# Patient Record
Sex: MALE | Race: WHITE | NOT HISPANIC OR LATINO
[De-identification: names, ages, dates, MRNs, and addresses within clinical notes are randomized per-mention and may not be internally consistent; named-entity substitution may affect disease eponyms.]

---

## 2017-04-25 PROBLEM — Z00.00 ENCOUNTER FOR PREVENTIVE HEALTH EXAMINATION: Status: ACTIVE | Noted: 2017-04-25

## 2017-04-26 ENCOUNTER — APPOINTMENT (OUTPATIENT)
Dept: MRI IMAGING | Facility: CLINIC | Age: 62
End: 2017-04-26

## 2017-04-26 ENCOUNTER — APPOINTMENT (OUTPATIENT)
Dept: NEUROLOGY | Facility: CLINIC | Age: 62
End: 2017-04-26

## 2017-04-26 ENCOUNTER — OUTPATIENT (OUTPATIENT)
Dept: OUTPATIENT SERVICES | Facility: HOSPITAL | Age: 62
LOS: 1 days | End: 2017-04-26

## 2017-04-26 VITALS
SYSTOLIC BLOOD PRESSURE: 141 MMHG | DIASTOLIC BLOOD PRESSURE: 95 MMHG | OXYGEN SATURATION: 93 % | HEIGHT: 69 IN | TEMPERATURE: 98 F | BODY MASS INDEX: 28.88 KG/M2 | WEIGHT: 195 LBS | HEART RATE: 72 BPM

## 2017-04-26 RX ORDER — ATORVASTATIN CALCIUM 40 MG/1
40 TABLET, FILM COATED ORAL DAILY
Refills: 0 | Status: ACTIVE | COMMUNITY

## 2017-04-27 LAB
ANION GAP SERPL CALC-SCNC: 15 MMOL/L
BUN SERPL-MCNC: 11 MG/DL
CALCIUM SERPL-MCNC: 9.8 MG/DL
CHLORIDE SERPL-SCNC: 101 MMOL/L
CO2 SERPL-SCNC: 24 MMOL/L
CREAT SERPL-MCNC: 0.97 MG/DL
GLUCOSE SERPL-MCNC: 106 MG/DL
POTASSIUM SERPL-SCNC: 4.8 MMOL/L
SODIUM SERPL-SCNC: 140 MMOL/L

## 2017-06-13 ENCOUNTER — INPATIENT (INPATIENT)
Facility: HOSPITAL | Age: 62
LOS: 7 days | Discharge: NURSING, REHAB OR LONG-TERM CARE | DRG: 473 | End: 2017-06-21
Attending: NEUROLOGICAL SURGERY | Admitting: NEUROLOGICAL SURGERY
Payer: COMMERCIAL

## 2017-06-13 VITALS
TEMPERATURE: 98 F | SYSTOLIC BLOOD PRESSURE: 128 MMHG | DIASTOLIC BLOOD PRESSURE: 86 MMHG | OXYGEN SATURATION: 99 % | RESPIRATION RATE: 18 BRPM | HEART RATE: 78 BPM

## 2017-06-13 LAB
ALBUMIN SERPL ELPH-MCNC: 4.3 G/DL — SIGNIFICANT CHANGE UP (ref 3.3–5)
ALP SERPL-CCNC: 76 U/L — SIGNIFICANT CHANGE UP (ref 40–120)
ALT FLD-CCNC: 26 U/L — SIGNIFICANT CHANGE UP (ref 10–45)
ANION GAP SERPL CALC-SCNC: 14 MMOL/L — SIGNIFICANT CHANGE UP (ref 5–17)
AST SERPL-CCNC: 20 U/L — SIGNIFICANT CHANGE UP (ref 10–40)
BILIRUB SERPL-MCNC: 0.4 MG/DL — SIGNIFICANT CHANGE UP (ref 0.2–1.2)
BUN SERPL-MCNC: 13 MG/DL — SIGNIFICANT CHANGE UP (ref 7–23)
CALCIUM SERPL-MCNC: 9.5 MG/DL — SIGNIFICANT CHANGE UP (ref 8.4–10.5)
CHLORIDE SERPL-SCNC: 99 MMOL/L — SIGNIFICANT CHANGE UP (ref 96–108)
CO2 SERPL-SCNC: 23 MMOL/L — SIGNIFICANT CHANGE UP (ref 22–31)
CREAT SERPL-MCNC: 0.8 MG/DL — SIGNIFICANT CHANGE UP (ref 0.5–1.3)
CRP SERPL-MCNC: 1.1 MG/DL — HIGH (ref 0–0.4)
ERYTHROCYTE [SEDIMENTATION RATE] IN BLOOD: 45 MM/HR — HIGH
GLUCOSE SERPL-MCNC: 114 MG/DL — HIGH (ref 70–99)
HCT VFR BLD CALC: 46.1 % — SIGNIFICANT CHANGE UP (ref 39–50)
HGB BLD-MCNC: 15.5 G/DL — SIGNIFICANT CHANGE UP (ref 13–17)
MCHC RBC-ENTMCNC: 29.1 PG — SIGNIFICANT CHANGE UP (ref 27–34)
MCHC RBC-ENTMCNC: 33.6 G/DL — SIGNIFICANT CHANGE UP (ref 32–36)
MCV RBC AUTO: 86.5 FL — SIGNIFICANT CHANGE UP (ref 80–100)
PLATELET # BLD AUTO: 362 K/UL — SIGNIFICANT CHANGE UP (ref 150–400)
POTASSIUM SERPL-MCNC: 4.2 MMOL/L — SIGNIFICANT CHANGE UP (ref 3.5–5.3)
POTASSIUM SERPL-SCNC: 4.2 MMOL/L — SIGNIFICANT CHANGE UP (ref 3.5–5.3)
PROT SERPL-MCNC: 8.1 G/DL — SIGNIFICANT CHANGE UP (ref 6–8.3)
RBC # BLD: 5.33 M/UL — SIGNIFICANT CHANGE UP (ref 4.2–5.8)
RBC # FLD: 12.7 % — SIGNIFICANT CHANGE UP (ref 10.3–16.9)
SODIUM SERPL-SCNC: 136 MMOL/L — SIGNIFICANT CHANGE UP (ref 135–145)
WBC # BLD: 8.5 K/UL — SIGNIFICANT CHANGE UP (ref 3.8–10.5)
WBC # FLD AUTO: 8.5 K/UL — SIGNIFICANT CHANGE UP (ref 3.8–10.5)

## 2017-06-13 PROCEDURE — 72125 CT NECK SPINE W/O DYE: CPT | Mod: 26

## 2017-06-13 PROCEDURE — 63082 REMOVE VERTEBRAL BODY ADD-ON: CPT

## 2017-06-13 PROCEDURE — 72156 MRI NECK SPINE W/O & W/DYE: CPT | Mod: 26

## 2017-06-13 PROCEDURE — 22554 ARTHRD ANT NTRBD MIN DSC CRV: CPT

## 2017-06-13 PROCEDURE — 22854 INSJ BIOMECHANICAL DEVICE: CPT

## 2017-06-13 PROCEDURE — 99285 EMERGENCY DEPT VISIT HI MDM: CPT | Mod: 25

## 2017-06-13 PROCEDURE — 22845 INSERT SPINE FIXATION DEVICE: CPT | Mod: 59

## 2017-06-13 PROCEDURE — 72052 X-RAY EXAM NECK SPINE 6/>VWS: CPT | Mod: 26

## 2017-06-13 PROCEDURE — 63081 REMOVE VERT BODY DCMPRN CRVL: CPT

## 2017-06-13 PROCEDURE — 22846 INSERT SPINE FIXATION DEVICE: CPT | Mod: 59

## 2017-06-13 PROCEDURE — 71010: CPT | Mod: 26

## 2017-06-13 PROCEDURE — 99223 1ST HOSP IP/OBS HIGH 75: CPT

## 2017-06-13 PROCEDURE — 22585 ARTHRD ANT NTRBD MIN DSC EA: CPT

## 2017-06-13 RX ORDER — DEXAMETHASONE 0.5 MG/5ML
96 ELIXIR ORAL ONCE
Qty: 0 | Refills: 0 | Status: COMPLETED | OUTPATIENT
Start: 2017-06-13 | End: 2017-06-13

## 2017-06-13 RX ORDER — PANTOPRAZOLE SODIUM 20 MG/1
40 TABLET, DELAYED RELEASE ORAL
Qty: 0 | Refills: 0 | Status: DISCONTINUED | OUTPATIENT
Start: 2017-06-13 | End: 2017-06-16

## 2017-06-13 RX ADMIN — Medication 119.2 MILLIGRAM(S): at 23:57

## 2017-06-13 NOTE — CONSULT NOTE ADULT - ASSESSMENT
Symptoms, exam, and imaging consistent with recurrent cord compression at C5/6, possibly due to instability at C6/7. Will get CT w/o contrast, x-rays with flex/ext. Check coags, type and screen. Neurosurgery consult. Admit under me to regional bed.

## 2017-06-13 NOTE — ED PROVIDER NOTE - OBJECTIVE STATEMENT
61M with weakness for 4-5 days, hx of C4/C5 spinal fusion 2 months ago. Reports tingling/numbness in finger tips. Sent in by Dr. Mitchell for stat MRI, NSG consultation. Pt denies cp/sob.

## 2017-06-13 NOTE — ED PROVIDER NOTE - MEDICAL DECISION MAKING DETAILS
61M with concern for cervical cord compression, R sided weakness. Appreciate evaluation in ED by Dr. Mitchell. Doubt infection with nl wbc count, plan for high-dose steroids, NSG consultation for spine and admission for possible operative repair tomorrow. Hx of previous spinal surgery, weakness x 4-5 days.

## 2017-06-13 NOTE — ED ADULT TRIAGE NOTE - CHIEF COMPLAINT QUOTE
BIBA from home , c/o bilateral leg weakness started last night and heaviness to lt arm started for awhile. s/o C5 C6 fx with surgery done last month. Pt unable to move and put weight on left leg.

## 2017-06-13 NOTE — CONSULT NOTE ADULT - SUBJECTIVE AND OBJECTIVE BOX
HPI: This is a 61 year old man with a history of cervical cord compression s/p C5/6  anterior decompression and fusion 5/18/17, p/w worsening weakness. He first presented to me in my office about 6 weeks ago, with 3 months of left leg dragging. He had some weakness and hyperreflexia in the left leg on exam and MRI of the cervical spine showed degenerative disease with cord compression at C5/6 with left-sided signal change. He had decompression and fusion at [facility] by Dr. Malachi Hayes with significant improvement of symptoms; in fact he felt back to normal a few days later. Then about 1 week ago he started feeling generally tired, but nothing too out of the ordinary. On Saturday he felt his left leg / foot turning in a bit when he walked, and some numbness in his hands; however he remained fully functional. Then yesterday, he took a shower, got out about 5:30pm, and sat on the couch; about an hour later he couldn't get up without assistance. He was helped to bed, went to sleep, and woke up feeling even weaker. At that time he called and I advised him to come to the ER as soon as possible. He has been taking tylenol every day and at times oxycodone for post op pain.     ROS: + constipation; no urinary incontinence    Exam:  Gen: appears well, well-groomed  MS: awake, alert, speech fluent, comprehension intact  CN: PERRL, EOMI, face symmetric  Motor: increased tone in LUE>RUE and b/l LE; strength - deltoid, biceps 5/5 b/l; triceps 4- R 4 L, wrist extension and flexion 4+ b/l, finger extension 3 R 2 L, hip flexion 3 R 2 L, knee flexion 4 b/l, knee extension 5 b/l, ankle dorsiflexion 3 R 2 L  Sensory: JPS moderately impaired at big toes b/l; vibration absent in big toes; no sensory level on back or anterior trunk with light touch or pinprick  Reflexes; 3+ UE and patellar, + Queen's L > R, + suprapatellar and cross-adduction b/l, sustained ankle clonus on left, 4-5 beats on right; plantar responses extensory b/l  Gait: unable to assess    MRI cervical spine 6/13/17 - on my read appears to be some retrolisthesis of C6 on C7, and posteriorly displaced osteophyte at C5/6 with resultant cord compression at C5/6 with cord compression and b/l signal change above the level of compression, and left > right signal change below that level. There is no evidence of abscess or hematoma.     WBC 8.5, ESR 45, CRP 1.10.

## 2017-06-13 NOTE — ED PROVIDER NOTE - PROGRESS NOTE DETAILS
Dr. Mitchell at bedside, plan for cervical spine MRI, will obtain UA as well. Pt with R/L weakness, concern for neuro sx after cervical spine surgery 1 month ago.

## 2017-06-14 DIAGNOSIS — G95.20 UNSPECIFIED CORD COMPRESSION: ICD-10-CM

## 2017-06-14 DIAGNOSIS — Z98.1 ARTHRODESIS STATUS: Chronic | ICD-10-CM

## 2017-06-14 DIAGNOSIS — R53.1 WEAKNESS: ICD-10-CM

## 2017-06-14 DIAGNOSIS — E78.5 HYPERLIPIDEMIA, UNSPECIFIED: ICD-10-CM

## 2017-06-14 DIAGNOSIS — R63.8 OTHER SYMPTOMS AND SIGNS CONCERNING FOOD AND FLUID INTAKE: ICD-10-CM

## 2017-06-14 DIAGNOSIS — K59.00 CONSTIPATION, UNSPECIFIED: ICD-10-CM

## 2017-06-14 DIAGNOSIS — Z29.9 ENCOUNTER FOR PROPHYLACTIC MEASURES, UNSPECIFIED: ICD-10-CM

## 2017-06-14 LAB
APTT BLD: 31.4 SEC — SIGNIFICANT CHANGE UP (ref 27.5–37.4)
BLD GP AB SCN SERPL QL: NEGATIVE — SIGNIFICANT CHANGE UP
HCV AB S/CO SERPL IA: 0.16 S/CO — SIGNIFICANT CHANGE UP
HCV AB SERPL-IMP: SIGNIFICANT CHANGE UP
INR BLD: 1.07 — SIGNIFICANT CHANGE UP (ref 0.88–1.16)
PROTHROM AB SERPL-ACNC: 11.9 SEC — SIGNIFICANT CHANGE UP (ref 9.8–12.7)
RH IG SCN BLD-IMP: POSITIVE — SIGNIFICANT CHANGE UP
RH IG SCN BLD-IMP: POSITIVE — SIGNIFICANT CHANGE UP

## 2017-06-14 PROCEDURE — 72146 MRI CHEST SPINE W/O DYE: CPT | Mod: 26

## 2017-06-14 PROCEDURE — 99233 SBSQ HOSP IP/OBS HIGH 50: CPT

## 2017-06-14 PROCEDURE — 93010 ELECTROCARDIOGRAM REPORT: CPT

## 2017-06-14 RX ORDER — ATORVASTATIN CALCIUM 80 MG/1
20 TABLET, FILM COATED ORAL AT BEDTIME
Qty: 0 | Refills: 0 | Status: DISCONTINUED | OUTPATIENT
Start: 2017-06-14 | End: 2017-06-21

## 2017-06-14 RX ORDER — SODIUM CHLORIDE 9 MG/ML
1000 INJECTION, SOLUTION INTRAVENOUS
Qty: 0 | Refills: 0 | Status: DISCONTINUED | OUTPATIENT
Start: 2017-06-14 | End: 2017-06-15

## 2017-06-14 RX ORDER — DEXAMETHASONE 0.5 MG/5ML
24 ELIXIR ORAL EVERY 6 HOURS
Qty: 0 | Refills: 0 | Status: DISCONTINUED | OUTPATIENT
Start: 2017-06-14 | End: 2017-06-15

## 2017-06-14 RX ORDER — POLYETHYLENE GLYCOL 3350 17 G/17G
17 POWDER, FOR SOLUTION ORAL DAILY
Qty: 0 | Refills: 0 | Status: DISCONTINUED | OUTPATIENT
Start: 2017-06-14 | End: 2017-06-21

## 2017-06-14 RX ORDER — ATORVASTATIN CALCIUM 80 MG/1
1 TABLET, FILM COATED ORAL
Qty: 0 | Refills: 0 | COMMUNITY

## 2017-06-14 RX ORDER — DEXTROSE 50 % IN WATER 50 %
12.5 SYRINGE (ML) INTRAVENOUS ONCE
Qty: 0 | Refills: 0 | Status: DISCONTINUED | OUTPATIENT
Start: 2017-06-14 | End: 2017-06-15

## 2017-06-14 RX ORDER — DEXAMETHASONE 0.5 MG/5ML
24 ELIXIR ORAL EVERY 6 HOURS
Qty: 0 | Refills: 0 | Status: DISCONTINUED | OUTPATIENT
Start: 2017-06-14 | End: 2017-06-14

## 2017-06-14 RX ORDER — INSULIN LISPRO 100/ML
VIAL (ML) SUBCUTANEOUS
Qty: 0 | Refills: 0 | Status: DISCONTINUED | OUTPATIENT
Start: 2017-06-14 | End: 2017-06-15

## 2017-06-14 RX ORDER — GLUCAGON INJECTION, SOLUTION 0.5 MG/.1ML
1 INJECTION, SOLUTION SUBCUTANEOUS ONCE
Qty: 0 | Refills: 0 | Status: DISCONTINUED | OUTPATIENT
Start: 2017-06-14 | End: 2017-06-21

## 2017-06-14 RX ORDER — SODIUM CHLORIDE 9 MG/ML
1000 INJECTION INTRAMUSCULAR; INTRAVENOUS; SUBCUTANEOUS
Qty: 0 | Refills: 0 | Status: DISCONTINUED | OUTPATIENT
Start: 2017-06-14 | End: 2017-06-14

## 2017-06-14 RX ORDER — DOCUSATE SODIUM 100 MG
100 CAPSULE ORAL DAILY
Qty: 0 | Refills: 0 | Status: DISCONTINUED | OUTPATIENT
Start: 2017-06-14 | End: 2017-06-15

## 2017-06-14 RX ORDER — HEPARIN SODIUM 5000 [USP'U]/ML
5000 INJECTION INTRAVENOUS; SUBCUTANEOUS EVERY 8 HOURS
Qty: 0 | Refills: 0 | Status: DISCONTINUED | OUTPATIENT
Start: 2017-06-14 | End: 2017-06-14

## 2017-06-14 RX ORDER — DEXTROSE 50 % IN WATER 50 %
25 SYRINGE (ML) INTRAVENOUS ONCE
Qty: 0 | Refills: 0 | Status: DISCONTINUED | OUTPATIENT
Start: 2017-06-14 | End: 2017-06-15

## 2017-06-14 RX ORDER — DEXTROSE 50 % IN WATER 50 %
1 SYRINGE (ML) INTRAVENOUS ONCE
Qty: 0 | Refills: 0 | Status: DISCONTINUED | OUTPATIENT
Start: 2017-06-14 | End: 2017-06-21

## 2017-06-14 RX ORDER — SODIUM CHLORIDE 9 MG/ML
1000 INJECTION, SOLUTION INTRAVENOUS
Qty: 0 | Refills: 0 | Status: DISCONTINUED | OUTPATIENT
Start: 2017-06-14 | End: 2017-06-21

## 2017-06-14 RX ADMIN — HEPARIN SODIUM 5000 UNIT(S): 5000 INJECTION INTRAVENOUS; SUBCUTANEOUS at 06:16

## 2017-06-14 RX ADMIN — PANTOPRAZOLE SODIUM 40 MILLIGRAM(S): 20 TABLET, DELAYED RELEASE ORAL at 06:16

## 2017-06-14 RX ADMIN — POLYETHYLENE GLYCOL 3350 17 GRAM(S): 17 POWDER, FOR SOLUTION ORAL at 11:55

## 2017-06-14 RX ADMIN — Medication: at 23:17

## 2017-06-14 RX ADMIN — Medication 2: at 17:27

## 2017-06-14 RX ADMIN — Medication 112 MILLIGRAM(S): at 13:15

## 2017-06-14 RX ADMIN — ATORVASTATIN CALCIUM 20 MILLIGRAM(S): 80 TABLET, FILM COATED ORAL at 21:31

## 2017-06-14 RX ADMIN — Medication 112 MILLIGRAM(S): at 20:30

## 2017-06-14 RX ADMIN — SODIUM CHLORIDE 70 MILLILITER(S): 9 INJECTION, SOLUTION INTRAVENOUS at 11:54

## 2017-06-14 RX ADMIN — Medication 100 MILLIGRAM(S): at 11:55

## 2017-06-14 NOTE — H&P ADULT - ATTENDING COMMENTS
This morning ~830am patient was re-assessed. He subjectively feels that it is easier to move his legs than he was prior to receiving dexamethasone. However exam is largely unchanged, with significant weakness of triceps, distal UEs, and L > R LEs with 1-2/5 left hip flexion, ankle dorsiflexion, and toe flexion / extension. Sustained ankle clonus and prominent downey's sign on left, both less prominent on the right. JPS impaired in left big toe, pinprick diminished on right compared to left. There appears to be a mid-thoracic anterior sensory level to pinprick on the right.     Imaging reviewed with Dr. Fournier and Dr. Lunsford. Will get MRI thoracic spine to rule out any other areas of cord compression. Continue dexamethasone 24mg q6h IV. IV fluids, pre-op labs, keep NPO.

## 2017-06-14 NOTE — H&P ADULT - NSHPLABSRESULTS_GEN_ALL_CORE
.  LABS:                         15.5   8.5   )-----------( 362      ( 13 Jun 2017 21:07 )             46.1     06-13    136  |  99  |  13  ----------------------------<  114<H>  4.2   |  23  |  0.80    Ca    9.5      13 Jun 2017 21:07    TPro  8.1  /  Alb  4.3  /  TBili  0.4  /  DBili  x   /  AST  20  /  ALT  26  /  AlkPhos  76  06-13    RADIOLOGY, EKG & ADDITIONAL TESTS:      CT CERVICAL SPINE    06/13/2017   ******PRELIMINARY REPORT******      IMPRESSION:  Status post C5-6 anterior cervical spinal fusion. Severe stenosis of the   spinal canal and bilateral  neural foramina at C5-6 and C6-7. Moderate to severe stenosis of the   spinal canal with severe  stenosis of the bilateral neural foramina at C3-4 and C4-5.  Prevertebral soft tissue collection near the surgical site likely   representing residual hematoma or  possibly seroma.     MR CERVICAL SPINE WO - W CON     6/13/2017    ******PRELIMINARY REPORT******    IMPRESSION:  Status post C5-6 anterior cervical spinal fusion with a moderate   prevertebral fluid collection likely  representing postoperative hematoma. No other significant interval   change. Severe persistent  stenosis of the spinal canal and bilateral neural foramina at C5-6 with   mass effect on the cord as  discussed above. Stable focal cord edema or possibly myelomalacia at   C5-6. Severe stenosis of the  spinal canal and bilateral neural foramina at C4-5 and C6-7.

## 2017-06-14 NOTE — PROGRESS NOTE ADULT - PROBLEM SELECTOR PLAN 1
D/w Dr. Mitchell, will discuss case with Dr. Fournier and present at spine conference in AM,  Decadron, PPI, ISS,  Neurology admission,  Will follow closely, possible plan for OR.

## 2017-06-14 NOTE — H&P ADULT - PROBLEM SELECTOR PLAN 6
NPO after midnight for possbile OR. Dash/TLC diet may be resumed if no OR. Replete lytes if k<4, mg<2

## 2017-06-14 NOTE — CHART NOTE - NSCHARTNOTEFT_GEN_A_CORE
Patient initially admitted under Neurology service. Being transferred to neurosurgery with plan for surgical revision of C-spine tomorrow. Will continue on 24 hrs of IV steroids prior to surgery. Patient initially admitted under Neurology service. Being transferred to neurosurgery with plan for surgical revision of C-spine tomorrow. Will continue on 24 hrs of IV steroids prior to surgery.    Notified by nurse that patient found to have urinary retention with 1000cc on bladder scan. Neurosurgery notified. Han catheter placed. Exam otherwise unchanged.

## 2017-06-14 NOTE — H&P ADULT - HISTORY OF PRESENT ILLNESS
62yo M w/ PMHx of HLD, h/o cervical cord compression s/p C5/C6 anterior decompression and fusion on 5/18/17 presents with weakness. Pt states that last week he started noticing his legs were more weak and his left foot started dragging. Yesterday, his weakness worsened dramatically to the point that he was unable to walk and get up without assistance. Since his surgery, he has been taking XS tylenol and oxycodone PRN for pain control. He denies urinary or fecal incontinence, with last BM 3-4 days ago. Denies fever, chills, nausea, vomiting, SOB, headache, diarrhea.     In ED, T 97.8F, HR 78, /86, RR 18, 99% on RA. Neurology consulted (Dr. Mitchell) as this patient is well-known to Dr. Mitchell. MRI cervical spine concerning for cord compression. Given decadron 96mg IVPB x1. Neurosurgery consulted who agreed with steroids with possible OR to be discussed tomorrow.

## 2017-06-14 NOTE — H&P ADULT - PROBLEM SELECTOR PLAN 4
HSQ q8h Pt has not had BM for 3-4 days. Has been taking oxycodone PRN for pain relief s/p cervical spinal fusion  - will order miralax, colace for bowel regimen

## 2017-06-14 NOTE — PROGRESS NOTE ADULT - SUBJECTIVE AND OBJECTIVE BOX
HISTORY OF PRESENT ILLNESS: 61 year old male w/ HLD, h/o cervical spinal cord compression s/p ACDF C5-C6 at S 4 weeks ago presents to ED today with complaints of worsening arm and leg weakness as well as sensation as of Friday with significant worsening as of last night. He reports feeling very week in his forearms and hands with some numbness of distal right arm as well as being unable to walk and feeling very weak in his legs. He denies numbness in his legs and denies bowel/bladder changes. He denies any significant neck or extremity pain. No recent injury/illness. He had some mild weakness in his hands from before his anterior cervical surgery but the current symptoms are significantly worse. MRI today is minimally changed from MRI in May with post-operative findings and persistent spinal cord compression with myelomalacia and severe spinal stenosis worst at C5-6, 6-7    PAST MEDICAL & SURGICAL HISTORY: As above, HLD on Lipitor     FAMILY HISTORY: No pertinent family history      SOCIAL HISTORY:  Tobacco Use: Denies smoking  EtOH use:  Denies ETOH abuse  Substance: Denies illicit drug use      Allergies    No Known Allergies          REVIEW OF SYSTEMS  Non-contributory.    MEDICATIONS:    Neuro:  diazepam    Tablet 5milliGRAM(s) Oral every 6 hours PRN    Anticoagulation:  heparin  Injectable 5000Unit(s) SubCutaneous every 8 hours    OTHER:  pantoprazole    Tablet 40milliGRAM(s) Oral before breakfast  insulin lispro (HumaLOG) corrective regimen sliding scale  SubCutaneous Before meals and at bedtime  dextrose Gel 1Dose(s) Oral once PRN  dextrose 50% Injectable 12.5Gram(s) IV Push once  dextrose 50% Injectable 25Gram(s) IV Push once  dextrose 50% Injectable 25Gram(s) IV Push once  glucagon  Injectable 1milliGRAM(s) IntraMuscular once PRN    IVF:  dextrose 5%. 1000milliLiter(s) IV Continuous <Continuous>      Vital Signs Last 24 Hrs  T(C): 36.6, Max: 36.6 (06-13 @ 20:14)  T(F): 97.8, Max: 97.8 (06-13 @ 20:14)  HR: 78 (78 - 78)  BP: 128/86 (128/86 - 128/86)  BP(mean): --  RR: 18 (18 - 18)  SpO2: 99% (99% - 99%)    PHYSICAL EXAM:  Gen: NAD, AAOx3. WN/WD  HEENT: PERRL. EOMI. VF intact. NC/AT.  Neck: Well healed left anterior neck incision. Nontender, FROM  Neuro: CNs II-XII intact. Hyperreflexic throughout with Clonus in b/l LEs. Sensation to LT intact throughout. Following commands. Gait not assessed.  3/5 in b/l hand , 4-/5 in b/l elbow extension, 4+/5 in elbow flexion, 5/5 in shoulder adduction. 2/5 in RLE hip flexion, and 3/5 in b/l plantarflexion, otherwise 1/5 throughout lower extremities.    LABS:                        15.5   8.5   )-----------( 362      ( 13 Jun 2017 21:07 )             46.1     06-13    136  |  99  |  13  ----------------------------<  114<H>  4.2   |  23  |  0.80    Ca    9.5      13 Jun 2017 21:07    TPro  8.1  /  Alb  4.3  /  TBili  0.4  /  DBili  x   /  AST  20  /  ALT  26  /  AlkPhos  76  06-13      RADIOLOGY & ADDITIONAL STUDIES: Status post C5-6 anterior cervical spinal fusion with a moderate   prevertebral fluid collection likely  representing postoperative hematoma. No other significant interval   change. Severe persistent  stenosis of the spinal canal and bilateral neural foramina at C5-6 with   mass effect on the cord as  discussed above. Stable focal cord edema or possibly myelomalacia at   C5-6. Severe stenosis of the  spinal canal and bilateral neural foramina at C4-5 and C6-7.

## 2017-06-14 NOTE — H&P ADULT - PROBLEM SELECTOR PLAN 5
NPO after midnight for possbile OR. Dash/TLC diet may be resumed if no OR. Replete lytes if k<4, mg<2 HSQ q8h

## 2017-06-14 NOTE — PATIENT PROFILE ADULT. - VISION (WITH CORRECTIVE LENSES IF THE PATIENT USUALLY WEARS THEM):
Partially impaired: cannot see medication labels or newsprint, but can see obstacles in path, and the surrounding layout; can count fingers at arm's length/reading eyeglaases

## 2017-06-14 NOTE — H&P ADULT - NSHPPHYSICALEXAM_GEN_ALL_CORE
.  VITAL SIGNS:  T(C): 36.6, Max: 36.6 (06-13 @ 20:14)  T(F): 97.9, Max: 97.9 (06-14 @ 00:59)  HR: 69 (69 - 78)  BP: 126/81 (126/81 - 128/86)  BP(mean): --  RR: 18 (18 - 18)  SpO2: 95% (95% - 99%)  Wt(kg): --    PHYSICAL EXAM:    Constitutional: WDWN resting comfortably in bed; NAD  Head: NC/AT  Eyes: PERRL, EOMI, clear conjunctiva  ENT: no nasal discharge; uvula midline, no oropharyngeal erythema or exudates; MMM  Neck: c-collar in place.   Respiratory: CTA B/L; no W/R/R, no retractions  Cardiac: +S1/S2; RRR; no M/R/G; PMI non-displaced  Gastrointestinal: soft, NT/ND; no rebound or guarding; hyperactive BS  Genitourinary: normal external genitalia  Back: did not perform cervical neck/back exam as pt in c-collar  Extremities: WWP, no clubbing or cyanosis; no peripheral edema  Musculoskeletal: no joint swelling, tenderness or erythema  Neurologic: AAOx3; CNII-XII grossly intact; 4/5 strength in UE b/l. 0/5 strength in LE bilaterally. sensation decreased, but intact in all extremities. Hyperreflexic in brachioradialis, patellar and achilles tendon

## 2017-06-14 NOTE — H&P ADULT - PROBLEM SELECTOR PLAN 1
Pt presenting with severe weakness in b/l LE, mildly decreased sensation in LE and mild weakness in b/l UE most likely 2/2 cervical cord compression in setting of spinal fusion of C5/C6 on 5/18/17.   - bladder scan for urinary retention  - f/u final read of MRI and CT cervical spine   - c/w decadron 24mg IVPB q6h starting at 10am   - s/p decadron 96mg IVPB x1 in ED  - ISS, protonix 40mg qAM  - neurosurgery consulted - will discuss case with Dr. Mitchell in AM   - NPO after midnight for possible OR  - will order coags, t&s for AM

## 2017-06-14 NOTE — PROGRESS NOTE ADULT - SUBJECTIVE AND OBJECTIVE BOX
Surgery: C5-6 corpectomy, C4-7 fusion  Consent: Signed by patient    No Known Allergies      OVERNIGHT EVENTS:    T(C): 36.7, Max: 36.8 (06-14 @ 08:00)  HR: 80 (69 - 83)  BP: 135/92 (112/79 - 145/91)  RR: 18 (16 - 18)  SpO2: 97% (95% - 99%)  Wt(kg): --    EXAM: A/Ox3, FC, speech clear  Bilateral UE 4/5  Right LE 2/5, Left LE 1/5  Sensation intact light touch all dermatomes      06-13    136  |  99  |  13  ----------------------------<  114<H>  4.2   |  23  |  0.80    Ca    9.5      13 Jun 2017 21:07    TPro  8.1  /  Alb  4.3  /  TBili  0.4  /  DBili  x   /  AST  20  /  ALT  26  /  AlkPhos  76  06-13    CBC Full  -  ( 13 Jun 2017 21:07 )  WBC Count : 8.5 K/uL  Hemoglobin : 15.5 g/dL  Hematocrit : 46.1 %  Platelet Count - Automated : 362 K/uL  Mean Cell Volume : 86.5 fL  Mean Cell Hemoglobin : 29.1 pg  Mean Cell Hemoglobin Concentration : 33.6 g/dL  Auto Neutrophil # : x  Auto Lymphocyte # : x  Auto Monocyte # : x  Auto Eosinophil # : x  Auto Basophil # : x  Auto Neutrophil % : x  Auto Lymphocyte % : x  Auto Monocyte % : x  Auto Eosinophil % : x  Auto Basophil % : x    PT/INR - ( 14 Jun 2017 11:31 )   PT: 11.9 sec;   INR: 1.07          PTT - ( 14 Jun 2017 11:31 )  PTT:31.4 sec    Pregnancy test: n/a  Type & Screen (in past 72hrs): ordered    CXR: n/a  EKG: ordered  ECHO: n/a   Medical Clearances: urgent surgery  Other Clearances:     Last dose of antiplatelet/anticoagulation drug: n/a    Implanted Devices (pacemaker, drug pump...etc):  []YES   [x] NO                  If yes --> EPS consulted to interrogate/adjust device:                 Assessment: preop for C5-6 corpectomy, C4-7 fusion    Plan:  -F/U preop labs and EKG  -Continue NPO except meds  -Continue Decadron 24q6h  -Consent in chart  -D/W Dr. Fournier

## 2017-06-15 ENCOUNTER — APPOINTMENT (OUTPATIENT)
Dept: SPINE | Facility: HOSPITAL | Age: 62
End: 2017-06-15

## 2017-06-15 ENCOUNTER — RESULT REVIEW (OUTPATIENT)
Age: 62
End: 2017-06-15

## 2017-06-15 LAB
ANION GAP SERPL CALC-SCNC: 17 MMOL/L — SIGNIFICANT CHANGE UP (ref 5–17)
BUN SERPL-MCNC: 31 MG/DL — HIGH (ref 7–23)
CALCIUM SERPL-MCNC: 9.3 MG/DL — SIGNIFICANT CHANGE UP (ref 8.4–10.5)
CHLORIDE SERPL-SCNC: 98 MMOL/L — SIGNIFICANT CHANGE UP (ref 96–108)
CO2 SERPL-SCNC: 23 MMOL/L — SIGNIFICANT CHANGE UP (ref 22–31)
CREAT SERPL-MCNC: 1.3 MG/DL — SIGNIFICANT CHANGE UP (ref 0.5–1.3)
GLUCOSE SERPL-MCNC: 141 MG/DL — HIGH (ref 70–99)
HCT VFR BLD CALC: 41.4 % — SIGNIFICANT CHANGE UP (ref 39–50)
HGB BLD-MCNC: 14.4 G/DL — SIGNIFICANT CHANGE UP (ref 13–17)
LYMPHOCYTES # BLD AUTO: 3.6 % — LOW (ref 13–44)
MCHC RBC-ENTMCNC: 30.1 PG — SIGNIFICANT CHANGE UP (ref 27–34)
MCHC RBC-ENTMCNC: 34.8 G/DL — SIGNIFICANT CHANGE UP (ref 32–36)
MCV RBC AUTO: 86.4 FL — SIGNIFICANT CHANGE UP (ref 80–100)
MONOCYTES NFR BLD AUTO: 5.7 % — SIGNIFICANT CHANGE UP (ref 2–14)
NEUTROPHILS NFR BLD AUTO: 90.7 % — HIGH (ref 43–77)
PLATELET # BLD AUTO: 343 K/UL — SIGNIFICANT CHANGE UP (ref 150–400)
POTASSIUM SERPL-MCNC: 4.1 MMOL/L — SIGNIFICANT CHANGE UP (ref 3.5–5.3)
POTASSIUM SERPL-SCNC: 4.1 MMOL/L — SIGNIFICANT CHANGE UP (ref 3.5–5.3)
RBC # BLD: 4.79 M/UL — SIGNIFICANT CHANGE UP (ref 4.2–5.8)
RBC # FLD: 12.3 % — SIGNIFICANT CHANGE UP (ref 10.3–16.9)
SODIUM SERPL-SCNC: 138 MMOL/L — SIGNIFICANT CHANGE UP (ref 135–145)
WBC # BLD: 21.7 K/UL — HIGH (ref 3.8–10.5)
WBC # FLD AUTO: 21.7 K/UL — HIGH (ref 3.8–10.5)

## 2017-06-15 RX ORDER — CEFAZOLIN SODIUM 1 G
2000 VIAL (EA) INJECTION EVERY 8 HOURS
Qty: 0 | Refills: 0 | Status: COMPLETED | OUTPATIENT
Start: 2017-06-15 | End: 2017-06-16

## 2017-06-15 RX ORDER — MAGNESIUM HYDROXIDE 400 MG/1
30 TABLET, CHEWABLE ORAL EVERY 12 HOURS
Qty: 0 | Refills: 0 | Status: DISCONTINUED | OUTPATIENT
Start: 2017-06-15 | End: 2017-06-16

## 2017-06-15 RX ORDER — DOCUSATE SODIUM 100 MG
100 CAPSULE ORAL THREE TIMES A DAY
Qty: 0 | Refills: 0 | Status: DISCONTINUED | OUTPATIENT
Start: 2017-06-15 | End: 2017-06-21

## 2017-06-15 RX ORDER — DEXTROSE 50 % IN WATER 50 %
1 SYRINGE (ML) INTRAVENOUS ONCE
Qty: 0 | Refills: 0 | Status: DISCONTINUED | OUTPATIENT
Start: 2017-06-15 | End: 2017-06-21

## 2017-06-15 RX ORDER — HYDROMORPHONE HYDROCHLORIDE 2 MG/ML
1 INJECTION INTRAMUSCULAR; INTRAVENOUS; SUBCUTANEOUS
Qty: 0 | Refills: 0 | Status: DISCONTINUED | OUTPATIENT
Start: 2017-06-15 | End: 2017-06-16

## 2017-06-15 RX ORDER — INSULIN LISPRO 100/ML
VIAL (ML) SUBCUTANEOUS
Qty: 0 | Refills: 0 | Status: DISCONTINUED | OUTPATIENT
Start: 2017-06-15 | End: 2017-06-21

## 2017-06-15 RX ORDER — DEXTROSE 50 % IN WATER 50 %
12.5 SYRINGE (ML) INTRAVENOUS ONCE
Qty: 0 | Refills: 0 | Status: DISCONTINUED | OUTPATIENT
Start: 2017-06-15 | End: 2017-06-21

## 2017-06-15 RX ORDER — GLUCAGON INJECTION, SOLUTION 0.5 MG/.1ML
1 INJECTION, SOLUTION SUBCUTANEOUS ONCE
Qty: 0 | Refills: 0 | Status: DISCONTINUED | OUTPATIENT
Start: 2017-06-16 | End: 2017-06-21

## 2017-06-15 RX ORDER — SENNA PLUS 8.6 MG/1
2 TABLET ORAL AT BEDTIME
Qty: 0 | Refills: 0 | Status: DISCONTINUED | OUTPATIENT
Start: 2017-06-15 | End: 2017-06-21

## 2017-06-15 RX ORDER — ACETAMINOPHEN 500 MG
650 TABLET ORAL EVERY 6 HOURS
Qty: 0 | Refills: 0 | Status: DISCONTINUED | OUTPATIENT
Start: 2017-06-15 | End: 2017-06-21

## 2017-06-15 RX ORDER — PANTOPRAZOLE SODIUM 20 MG/1
40 TABLET, DELAYED RELEASE ORAL
Qty: 0 | Refills: 0 | Status: DISCONTINUED | OUTPATIENT
Start: 2017-06-16 | End: 2017-06-21

## 2017-06-15 RX ORDER — DEXAMETHASONE 0.5 MG/5ML
4 ELIXIR ORAL EVERY 6 HOURS
Qty: 0 | Refills: 0 | Status: DISCONTINUED | OUTPATIENT
Start: 2017-06-15 | End: 2017-06-18

## 2017-06-15 RX ORDER — METOCLOPRAMIDE HCL 10 MG
10 TABLET ORAL ONCE
Qty: 0 | Refills: 0 | Status: DISCONTINUED | OUTPATIENT
Start: 2017-06-15 | End: 2017-06-21

## 2017-06-15 RX ORDER — SODIUM CHLORIDE 9 MG/ML
1000 INJECTION INTRAMUSCULAR; INTRAVENOUS; SUBCUTANEOUS
Qty: 0 | Refills: 0 | Status: DISCONTINUED | OUTPATIENT
Start: 2017-06-15 | End: 2017-06-16

## 2017-06-15 RX ORDER — DEXTROSE 50 % IN WATER 50 %
25 SYRINGE (ML) INTRAVENOUS ONCE
Qty: 0 | Refills: 0 | Status: DISCONTINUED | OUTPATIENT
Start: 2017-06-15 | End: 2017-06-21

## 2017-06-15 RX ORDER — DEXTROSE 50 % IN WATER 50 %
25 SYRINGE (ML) INTRAVENOUS ONCE
Qty: 0 | Refills: 0 | Status: DISCONTINUED | OUTPATIENT
Start: 2017-06-15 | End: 2017-06-15

## 2017-06-15 RX ADMIN — PANTOPRAZOLE SODIUM 40 MILLIGRAM(S): 20 TABLET, DELAYED RELEASE ORAL at 07:17

## 2017-06-15 RX ADMIN — Medication 2: at 12:40

## 2017-06-15 RX ADMIN — Medication 112 MILLIGRAM(S): at 13:36

## 2017-06-15 RX ADMIN — Medication 2: at 07:19

## 2017-06-15 RX ADMIN — Medication 112 MILLIGRAM(S): at 04:21

## 2017-06-15 RX ADMIN — Medication 112 MILLIGRAM(S): at 07:17

## 2017-06-15 NOTE — CONSULT NOTE ADULT - SUBJECTIVE AND OBJECTIVE BOX
Pre-surgical Pain Inventory    HPI:  62yo M w/ PMHx of HLD, h/o cervical cord compression s/p C5/C6 anterior decompression and fusion on 5/18/17 presents with weakness. Pt states that last week he started noticing his legs were more weak and his left foot started dragging. Yesterday, his weakness worsened dramatically to the point that he was unable to walk and get up without assistance. Since his surgery, he has been taking XS tylenol and oxycodone PRN for pain control. He denies urinary or fecal incontinence, with last BM 3-4 days ago. Denies fever, chills, nausea, vomiting, SOB, headache, diarrhea.     In ED, T 97.8F, HR 78, /86, RR 18, 99% on RA. Neurology consulted (Dr. Mitchell) as this patient is well-known to Dr. Mitchell. MRI cervical spine concerning for cord compression. Given decadron 96mg IVPB x1. Neurosurgery consulted who agreed with steroids with possible OR to be discussed tomorrow. (14 Jun 2017 02:04)      Surgery: Plan for C5C6 re-exploration     Surgeon:     Past Medical & Surgical History:   PAST MEDICAL & SURGICAL HISTORY:  Cervical stenosis of spine  Hyperlipidemia  S/P cervical spinal fusion: 5/18/17      Current Pre-op Pain Medications    Past Pain Medication:   [X] Oxycodone  - S/p ACDF  [] Hydrocodone  [] Methadone  [] Fentanyl  [X] Dilaudid  [X] Morphine  - W/ knee surgeries  [X] Other adjuvants   - Ambien for insomnia  [] NSAIDs/Anti-inflammatory    Allergies  No Known Allergies    Intolerances    REVIEW OF SYSTEMS:  CONSTITUTIONAL: No fever, weight loss, or fatigue  EYES: No eye pain, visual disturbances, or discharge  ENMT:  No difficulty hearing, tinnitus, vertigo; No sinus or throat pain  NECK: No pain or stiffness. Pt reports severe deterioration in R sided strength--but   RESPIRATORY: No cough, wheezing, chills or hemoptysis; No shortness of breath  CARDIOVASCULAR: No chest pain, palpitations, dizziness, or leg swelling  GASTROINTESTINAL: No abdominal or epigastric pain. No nausea, vomiting, or hematemesis; No diarrhea or constipation.  GENITOURINARY: No dysuria, frequency, hematuria, or incontinence  NEUROLOGICAL: LOS, as above. No headaches, memory loss, numbness, or tremors  SKIN: No itching, burning, rashes, or lesions   MUSCULOSKELETAL: No joint pain or swelling; No muscle, back, or extremity pain  PSYCHIATRIC: No depression, anxiety, mood swings, or difficulty sleeping      Vital Signs Last 24 Hrs  T(C): 36.8, Max: 36.8 (06-14 @ 22:00)  T(F): 98.2, Max: 98.2 (06-14 @ 22:00)  HR: 75 (72 - 80)  BP: 158/75 (122/74 - 158/75)  BP(mean): 94 (94 - 99)  RR: 15 (15 - 18)  SpO2: 95% (94% - 98%)    FUNCTIONAL ASSESSMENT:  PAIN SCORE AT REST: Denies        SCALE USED: (1-10 VNRS)  PAIN SCORE WITH ACTIVITY:   Denies      SCALE USED: (1-10 VNRS)    PHYSICAL EXAM:  GENERAL: NAD  HEAD:  Atraumatic, Normocephalic  EYES: EOMI, PERRLA, conjunctiva and sclera clear  NECK: Supple  NERVOUS SYSTEM:  Alert & Oriented X3, Good concentration;   Motor Strength: 5/5 biceps/triceps, full abduction/adduction; 2/5 L hand , unable to extend digits; 3/5 R hand   upper and lower extremities; 2/5 IN RLE & hip/flexor--unable to lift RLE against gravitiy. 4/5 in LLE & hip flexor; DTRs 2+ intact and symmetric.  CHEST/LUNG: Clear to auscultation bilaterally; No rales, rhonchi, wheezing, or rubs  HEART: Regular rate and rhythm; No murmurs, rubs, or gallops  ABDOMEN: Soft, Nontender, Nondistended; Bowel sounds present  EXTREMITIES:  2+ Peripheral Pulses, No clubbing, cyanosis, or edema  SKIN: No rashes or lesions        Assessment:   62yo M with c/o acute weakness, w/ plan for re-exporation of C5-C6 corpectomy/fusion    Plan:   - D/w Dr. Rodrigo Campbell  Immediate post-op plan  - Percocet 5mg q4h PRN for Mod Pain  - Percocet 10mg q4h PRN for Severe Pain  - Dilaudid 1mg q3h PRN for Breakthrough Pain  - Ofirmev x 1, if pain unrelieved with Dilaudid for Severe Breakthrough Pain  - Flexeril 10mg q8h PRN for Muscle spasms  - Ambien qbedtime (per pt request)    - If posterior approach, plan was to do PCA--however given anterior approach--will proceed with PO regimen and more conservative orders, per pt request.  - If severe pain uncontrolled by above regimen overnight, would recommend dose escalating to PO Dilaudid 8mg q4h for Severe 4mg q4h PRN for Mod--depends on pain level post-op given this surgery is a revision    	  Rescue plan  - Dilaudid, as above    Tentative floor plan  - Percocet, Flexeril (likely not necessary), Ambien

## 2017-06-15 NOTE — CONSULT NOTE ADULT - ASSESSMENT
62 yo M with cervical cord compression s/p 6/15 corpectomy of C5-C6 and fusion of C4-C7.  Neuro: Neurochecks  CV: Normotensive  Pulm: RA  EMREI: NPO, Protonix, NS@100, docusate, miralax  : Han  Endo: SSI, dexamethasone 4mg q6hr  ID: [id number] x 24hrs  Heme: SCD  Lines: PIV, R A Line (6/15)  Wounds: No drain

## 2017-06-15 NOTE — CONSULT NOTE ADULT - SUBJECTIVE AND OBJECTIVE BOX
62yo M hx HLD, cervical cord compression s/p C5/C6 anterior decompression and fusion on 17 at [facility] presents with UE+LE weakness for past week. Pt had 2-3/5RLE weakness, 1-2/5LLE weakness pre-op and 4/5 UE b/l pre-op as well as urinary retention s/p odell placement. MRI showed cord compression. Received decadron pre-op. On 6/15 he went for corpectomy of C5-C6 and fusion of C4-C7. Extubated post-op, no drains.    PMH: HLD, cervical cord compression  Meds: Atorvastatin 20mg  PSH: C5/C6 anterior decompression    Sx: 6/15 corpectomy of C5-C6 and fusion of C4-C7.    ICU Vital Signs Last 24 Hrs  T(C): 36.9, Max: 36.9 (-15 @ 20:30)  T(F): 98.4, Max: 98.4 (15 @ 20:30)  HR: 72 (72 - 75)  BP: 131/87 (122/74 - 158/75)  BP(mean): 94 (94 - 94)  ABP: --  ABP(mean): --  RR: 18 (15 - 18)  SpO2: 96% (95% - 98%)    PHYSICAL EXAM:    Constitutional:     Respiratory:    Cardiovascular:    Gastrointestinal:    Extremities:      EXAM:  CT CERVICAL SPINE                          PROCEDURE DATE:  2017                     INTERPRETATION:  Cyn WAY MD, have reviewed the images and   the report and agree with the findings, with the following modification:     No prior CT is available for comparison. The patient is status post   anterior cervical discectomy and fusion at the C5-6 level. The hardware   appears intact. The left-sided more superior screw traverses the C5-6   disc space but does not enter the spinal canal. Minimal prevertebral   swelling is noted.    Persistent severe spinal stenosis at C5-6 appears mostly secondary to   osseous ridging, best seen on series 10, image 40. There is more moderate   spinal stenosis at the C4-5 and C6-7 levels. Alignment shows slight   retrolisthesis at C4-5 by approximately 3 mm.    There is multilevel uncovertebral spurring along with mild facet   hypertrophic changes. Findings contribute to multilevel neural foraminal   narrowing, moderate to severe atC3-4, greater on the left, moderate on   the right and mild on the left at C4-5, severe on the left and mild on   the right at C5-6, an moderate to severe bilaterally at C6-7, right   somewhat more than left.    Case was reviewed with Dr. Mitchell in person at 9:10 AM on 2017.      [facility]  Preliminary Radiology Report  Call: [phone]  assistance Online chat: [email or web address]  Patient Name: JG JARVIS MRN: [id number]   (Age): 1955 61 Gender: M  Date of Exam: 2017 Accession: [id number]  Referring Physician: UNKNOWN # of Images: [id number]  Ordered As: CT SPINE CERVICAL WO    EXAM:  CT Cervical Spine Without Intravenous Contrast    CLINICAL HISTORY:  61 years old, male; Pain; Neck pain; Prior surgery; Surgery date: 1-6   months; Surgery type: C5/6  fusion; Patient HX: Cord compression c6/7 S/P c5/6 fusion 1 m o ago    TECHNIQUE:  Axial computed tomography images of the cervical spine without   intravenous contrast.  Coronal and sagittal reformatted images were created and reviewed.    COMPARISON:  MR CERVICAL SPINE WO - W CON 2017 9:43:22 PM    FINDINGS:  Vertebrae: Loss of normal cervical lordosis. Mild anterolisthesis of C7   on T1. No acute fracture.  Discs/spinal canal/neural foramina: Status post C5-6 anterior cervical   spinal fusion. Severe stenosis  of the spinal canal and bilateral neural foramina at C5-6 and C6-7.   Moderate to severe stenosis of  the spinal canal with severe stenosis of the bilateral neural foramina at   C3-4 and C4-5.  Soft tissues: Prevertebral soft tissue collection near the surgical site   likely representing residual  hematoma or possibly seroma.  Lung apices: Unremarkable as visualized.    IMPRESSION:  Status post C5-6 anterior cervical spinal fusion. Severe stenosis of the   spinal canal and bilateral  neural foramina at C5-6 and C6-7. Moderate to severe stenosis of the   spinal canal with severe  stenosis of the bilateral neural foramina at C3-4 and C4-5.  Prevertebral soft tissue collection nearthe surgical site likely   representing residual hematoma or  possibly seroma.    Thank you for allowing us to participate in the care of your patient.  Dictated and Authenticated by: Taurus Castle MD  2017 1:09 AM Eastern Time ([place] & [place])    The above report was submitted by the [facility] attending radiologist and   copied to PowerScribe by resident Dr. Stauffer.            "Thank you for the opportunity to participate in the care of this   patient."    GEOVANNY STAUFFER M.D., RADIOLOGY RESIDENT  This document has been electronically signed.  CYN MENDIOLA M.D. ATTENDING RADIOLOGIST  This document has been electronically signed. 2017  9:28AM 60yo M hx HLD, cervical cord compression s/p C5/C6 anterior decompression and fusion on 17 at [facility] presents with UE+LE weakness for past week. Pt had 2-3/5RLE weakness, 1-2/5LLE weakness pre-op and 4/5 UE b/l pre-op as well as urinary retention s/p odell placement. MRI showed cord compression. Received decadron pre-op. On 6/15 he went for corpectomy of C5-C6 and fusion of C4-C7. Extubated post-op, no drains.    PMH: HLD, cervical cord compression  Meds: Atorvastatin 20mg  PSH: C5/C6 anterior decompression    Sx: 6/15 corpectomy of C5-C6 and fusion of C4-C7.    ICU Vital Signs Last 24 Hrs  T(C): 36.9, Max: 36.9 (-15 @ 20:30)  T(F): 98.4, Max: 98.4 (06-15 @ 20:30)  HR: 72 (72 - 75)  BP: 131/87 (122/74 - 158/75)  BP(mean): 94 (94 - 94)  ABP: --  ABP(mean): --  RR: 18 (15 - 18)  SpO2: 96% (95% - 98%)    PHYSICAL EXAM:    Constitutional: Alert and conversant, AAOx3 in NAD  Neck brace in place    Respiratory: CTA, nonlabored    Cardiovascular: NSR    Gastrointestinal: soft, nontender, nondistended    Extremities: warm, well-perfused    Neuro: RUE 5/5, LUE 4+/5, RLE 5/5, LLE 3/5, sensation intact throughout. follows commands, speaks in full sentences, no CN deficits noted.       EXAM:  CT CERVICAL SPINE                          PROCEDURE DATE:  2017                     INTERPRETATION:  Cyn WAY MD, have reviewed the images and   the report and agree with the findings, with the following modification:     No prior CT is available for comparison. The patient is status post   anterior cervical discectomy and fusion at the C5-6 level. The hardware   appears intact. The left-sided more superior screw traverses the C5-6   disc space but does not enter the spinal canal. Minimal prevertebral   swelling is noted.    Persistent severe spinal stenosis at C5-6 appears mostly secondary to   osseous ridging, best seen on series 10, image 40. There is more moderate   spinal stenosis at the C4-5 and C6-7 levels. Alignment shows slight   retrolisthesis at C4-5 by approximately 3 mm.    There is multilevel uncovertebral spurring along with mild facet   hypertrophic changes. Findings contribute to multilevel neural foraminal   narrowing, moderate to severe atC3-4, greater on the left, moderate on   the right and mild on the left at C4-5, severe on the left and mild on   the right at C5-6, an moderate to severe bilaterally at C6-7, right   somewhat more than left.    Case was reviewed with Dr. Mitchell in person at 9:10 AM on 2017.      [facility]  Preliminary Radiology Report  Call: [phone]  assistance Online chat: [email or web address]  Patient Name: JG JARVIS MRN: [id number]   (Age): 1955 61 Gender: M  Date of Exam: 2017 Accession: [id number]  Referring Physician: UNKNOWN # of Images: [id number]  Ordered As: CT SPINE CERVICAL WO    EXAM:  CT Cervical Spine Without Intravenous Contrast    CLINICAL HISTORY:  61 years old, male; Pain; Neck pain; Prior surgery; Surgery date: 1-6   months; Surgery type: C5/6  fusion; Patient HX: Cord compression c6/7 S/P c5/6 fusion 1 m o ago    TECHNIQUE:  Axial computed tomography images of the cervical spine without   intravenous contrast.  Coronal and sagittal reformatted images were created and reviewed.    COMPARISON:  MR CERVICAL SPINE WO - W CON 2017 9:43:22 PM    FINDINGS:  Vertebrae: Loss of normal cervical lordosis. Mild anterolisthesis of C7   on T1. No acute fracture.  Discs/spinal canal/neural foramina: Status post C5-6 anterior cervical   spinal fusion. Severe stenosis  of the spinal canal and bilateral neural foramina at C5-6 and C6-7.   Moderate to severe stenosis of  the spinal canal with severe stenosis of the bilateral neural foramina at   C3-4 and C4-5.  Soft tissues: Prevertebral soft tissue collection near the surgical site   likely representing residual  hematoma or possibly seroma.  Lung apices: Unremarkable as visualized.    IMPRESSION:  Status post C5-6 anterior cervical spinal fusion. Severe stenosis of the   spinal canal and bilateral  neural foramina at C5-6 and C6-7. Moderate to severe stenosis of the   spinal canal with severe  stenosis of the bilateral neural foramina at C3-4 and C4-5.  Prevertebral soft tissue collection nearthe surgical site likely   representing residual hematoma or  possibly seroma.    Thank you for allowing us to participate in the care of your patient.  Dictated and Authenticated by: Taurus Castle MD  2017 1:09 AM Eastern Time ([place] & [place])    The above report was submitted by the [facility] attending radiologist and   copied to PowerScribe by resident Dr. Stauffer.            "Thank you for the opportunity to participate in the care of this   patient."    GEOVANNY STAUFFER M.D., RADIOLOGY RESIDENT  This document has been electronically signed.  CYN MENDIOLA M.D. ATTENDING RADIOLOGIST  This document has been electronically signed. 2017  9:28AM

## 2017-06-15 NOTE — BRIEF OPERATIVE NOTE - PROCEDURE
Cervical corpectomy by anterior approach of additional segment following inital cervical corpectomy by anterior approach of single segment  06/15/2017  C5 C6 with fusion C4-C7  Active  ASALVATORE

## 2017-06-15 NOTE — CHART NOTE - NSCHARTNOTEFT_GEN_A_CORE
Es - ENT    Planned for C4-5 corpectomy  Previous surgery  Asked to provide access by NSx    Discussed with pt  Risks discussed - 1% injury to great vessels, pharynx, RLN    FNE - Bilateral mobile vocal folds    Plan: Access via previous incision

## 2017-06-16 LAB
ANION GAP SERPL CALC-SCNC: 12 MMOL/L — SIGNIFICANT CHANGE UP (ref 5–17)
BLD GP AB SCN SERPL QL: NEGATIVE — SIGNIFICANT CHANGE UP
BUN SERPL-MCNC: 28 MG/DL — HIGH (ref 7–23)
CALCIUM SERPL-MCNC: 9 MG/DL — SIGNIFICANT CHANGE UP (ref 8.4–10.5)
CHLORIDE SERPL-SCNC: 102 MMOL/L — SIGNIFICANT CHANGE UP (ref 96–108)
CO2 SERPL-SCNC: 24 MMOL/L — SIGNIFICANT CHANGE UP (ref 22–31)
CREAT SERPL-MCNC: 1.1 MG/DL — SIGNIFICANT CHANGE UP (ref 0.5–1.3)
GLUCOSE SERPL-MCNC: 139 MG/DL — HIGH (ref 70–99)
HBA1C BLD-MCNC: 5.9 % — HIGH (ref 4–5.6)
HCT VFR BLD CALC: 40.6 % — SIGNIFICANT CHANGE UP (ref 39–50)
HGB BLD-MCNC: 13.7 G/DL — SIGNIFICANT CHANGE UP (ref 13–17)
LYMPHOCYTES # BLD AUTO: 3.9 % — LOW (ref 13–44)
MAGNESIUM SERPL-MCNC: 2.3 MG/DL — SIGNIFICANT CHANGE UP (ref 1.6–2.6)
MCHC RBC-ENTMCNC: 29.3 PG — SIGNIFICANT CHANGE UP (ref 27–34)
MCHC RBC-ENTMCNC: 33.7 G/DL — SIGNIFICANT CHANGE UP (ref 32–36)
MCV RBC AUTO: 86.9 FL — SIGNIFICANT CHANGE UP (ref 80–100)
MONOCYTES NFR BLD AUTO: 3.7 % — SIGNIFICANT CHANGE UP (ref 2–14)
NEUTROPHILS NFR BLD AUTO: 92.4 % — HIGH (ref 43–77)
PHOSPHATE SERPL-MCNC: 5.4 MG/DL — HIGH (ref 2.5–4.5)
PLATELET # BLD AUTO: 332 K/UL — SIGNIFICANT CHANGE UP (ref 150–400)
POTASSIUM SERPL-MCNC: 4.2 MMOL/L — SIGNIFICANT CHANGE UP (ref 3.5–5.3)
POTASSIUM SERPL-SCNC: 4.2 MMOL/L — SIGNIFICANT CHANGE UP (ref 3.5–5.3)
RBC # BLD: 4.67 M/UL — SIGNIFICANT CHANGE UP (ref 4.2–5.8)
RBC # FLD: 12.4 % — SIGNIFICANT CHANGE UP (ref 10.3–16.9)
SODIUM SERPL-SCNC: 138 MMOL/L — SIGNIFICANT CHANGE UP (ref 135–145)
WBC # BLD: 18.6 K/UL — HIGH (ref 3.8–10.5)
WBC # FLD AUTO: 18.6 K/UL — HIGH (ref 3.8–10.5)

## 2017-06-16 PROCEDURE — 99291 CRITICAL CARE FIRST HOUR: CPT | Mod: 24

## 2017-06-16 PROCEDURE — 99232 SBSQ HOSP IP/OBS MODERATE 35: CPT

## 2017-06-16 RX ORDER — TAMSULOSIN HYDROCHLORIDE 0.4 MG/1
0.4 CAPSULE ORAL AT BEDTIME
Qty: 0 | Refills: 0 | Status: DISCONTINUED | OUTPATIENT
Start: 2017-06-16 | End: 2017-06-21

## 2017-06-16 RX ORDER — ZOLPIDEM TARTRATE 10 MG/1
5 TABLET ORAL AT BEDTIME
Qty: 0 | Refills: 0 | Status: DISCONTINUED | OUTPATIENT
Start: 2017-06-16 | End: 2017-06-19

## 2017-06-16 RX ORDER — MAGNESIUM HYDROXIDE 400 MG/1
30 TABLET, CHEWABLE ORAL DAILY
Qty: 0 | Refills: 0 | Status: DISCONTINUED | OUTPATIENT
Start: 2017-06-16 | End: 2017-06-21

## 2017-06-16 RX ADMIN — Medication 100 MILLIGRAM(S): at 05:46

## 2017-06-16 RX ADMIN — ATORVASTATIN CALCIUM 20 MILLIGRAM(S): 80 TABLET, FILM COATED ORAL at 00:00

## 2017-06-16 RX ADMIN — Medication 4 MILLIGRAM(S): at 00:19

## 2017-06-16 RX ADMIN — SENNA PLUS 2 TABLET(S): 8.6 TABLET ORAL at 00:01

## 2017-06-16 RX ADMIN — Medication 4 MILLIGRAM(S): at 05:47

## 2017-06-16 RX ADMIN — Medication 100 MILLIGRAM(S): at 14:34

## 2017-06-16 RX ADMIN — Medication 4 MILLIGRAM(S): at 12:08

## 2017-06-16 RX ADMIN — SODIUM CHLORIDE 100 MILLILITER(S): 9 INJECTION INTRAMUSCULAR; INTRAVENOUS; SUBCUTANEOUS at 08:41

## 2017-06-16 RX ADMIN — Medication 4 MILLIGRAM(S): at 18:09

## 2017-06-16 RX ADMIN — Medication 100 MILLIGRAM(S): at 05:47

## 2017-06-16 RX ADMIN — ATORVASTATIN CALCIUM 20 MILLIGRAM(S): 80 TABLET, FILM COATED ORAL at 21:16

## 2017-06-16 RX ADMIN — MAGNESIUM HYDROXIDE 30 MILLILITER(S): 400 TABLET, CHEWABLE ORAL at 20:05

## 2017-06-16 RX ADMIN — Medication 100 MILLIGRAM(S): at 00:09

## 2017-06-16 RX ADMIN — Medication 100 MILLIGRAM(S): at 21:17

## 2017-06-16 RX ADMIN — TAMSULOSIN HYDROCHLORIDE 0.4 MILLIGRAM(S): 0.4 CAPSULE ORAL at 21:17

## 2017-06-16 RX ADMIN — PANTOPRAZOLE SODIUM 40 MILLIGRAM(S): 20 TABLET, DELAYED RELEASE ORAL at 06:09

## 2017-06-16 RX ADMIN — SENNA PLUS 2 TABLET(S): 8.6 TABLET ORAL at 21:17

## 2017-06-16 RX ADMIN — Medication 100 MILLIGRAM(S): at 13:59

## 2017-06-16 NOTE — PROGRESS NOTE ADULT - ATTENDING COMMENTS
PLAN:   NEURO: spinal checks q1h, pain PRN tylenol / percocets  SCC: s/p decompression; hard collar at all times  decadron 4mg IV q6h  REHAB:  physical therapy evaluation and management    EARLY MOB:  HOB up    PULM:  Room air, incentive spirometry  CARDIO:  SBP goal 100-150mm Hg  ENDO:  Blood sugar goals 140-180 mg/dL, continue insulin sliding scale  GI:  PPI for GI prophylaxis  DIET: regular  RENAL:  IVF ; start tamsulosin, TOV tomorrow  HEM/ONC: Hb - anemia post-op  VTE Prophylaxis: SCDs, wlil re-evaluate for DVT chemoprophylaxis tomorrow, patient fresh post-op  ID: afebrile, no leukocytosis  Social: will update family    Patient at high risk for neurological deterioration or death due to:    Critical care time, excluding procedures: 60 minutes. PLAN:   NEURO: spinal checks q2h, pain PRN tylenol / percocets  SCC: s/p decompression; hard collar at all times  decadron 4mg IV q6h - taper as per NS  REHAB:  physical therapy evaluation and management    EARLY MOB:  HOB up    PULM:  Room air, incentive spirometry  CARDIO:  SBP goal 100-150mm Hg  ENDO:  Blood sugar goals 140-180 mg/dL, continue insulin sliding scale  GI:  PPI for GI prophylaxis  DIET: regular  RENAL:  IVF ; start tamsulosin, TOV tomorrow  HEM/ONC: Hb - anemia post-op  VTE Prophylaxis: SCDs, wlil re-evaluate for DVT chemoprophylaxis tomorrow, patient fresh post-op  ID: afebrile, no leukocytosis  Social: will update family    Patient at high risk for neurological deterioration or death due to:    Critical care time, excluding procedures: 60 minutes.

## 2017-06-16 NOTE — PROGRESS NOTE ADULT - SUBJECTIVE AND OBJECTIVE BOX
Pt was examined at the bedside, pain is well controlled, pt denies sob, cp, acute numbness or weakness, difficulty swallowing, n/v    ICU Vital Signs Last 24 Hrs  T(C): 36.6, Max: 36.9 (06-15 @ 20:30)  T(F): 97.8, Max: 98.4 (06-15 @ 20:30)  HR: 66 (66 - 80)  BP: 156/88 (122/74 - 158/90)  BP(mean): --  ABP: 158/77 (146/72 - 160/74)  ABP(mean): --  RR: 16 (15 - 26)  SpO2: 97% (94% - 98%)                          14.4   21.7  )-----------( 343      ( 15 Nikolas 2017 21:31 )             41.4   06-15    138  |  98  |  31<H>  ----------------------------<  141<H>  4.1   |  23  |  1.30    Ca    9.3      15 Nikolas 2017 21:31    Exam:  AA&Ox3, NAD, clear coherent speech,  CNs II -XII grossly intact  motor: b/l UE 4+/5, RLE 4/5, LLE hip 3-4/5, knee 3-4/5, foot DV & PF 1-2/5  sensation to LT grossly intact  Neck: trach midline, no palpable collections, no drainage, dressing clean & dry,    Plan:  Cont Decadron 4q6  Pain Meds PRN  SCDs, IS, Bowel Regimen  Advance diet as tolerated  Hard Collar on at all times  PT/OT eval - pending  f/u Labs in AM  monitor odell output  D/w Dr. Fournier

## 2017-06-16 NOTE — PROGRESS NOTE ADULT - SUBJECTIVE AND OBJECTIVE BOX
NEUROSURGERY PAIN MANAGEMENT CONSULT NOTE    Chief Complaint:    HPI:  62yo M w/ PMHx of HLD, h/o cervical cord compression s/p C5/C6 anterior decompression and fusion on 5/18/17 presents with weakness. Pt states that last week he started noticing his legs were more weak and his left foot started dragging. Yesterday, his weakness worsened dramatically to the point that he was unable to walk and get up without assistance. Since his surgery, he has been taking XS tylenol and oxycodone PRN for pain control. He denies urinary or fecal incontinence, with last BM 3-4 days ago. Denies fever, chills, nausea, vomiting, SOB, headache, diarrhea.     In ED, T 97.8F, HR 78, /86, RR 18, 99% on RA. Neurology consulted (Dr. Mitchell) as this patient is well-known to Dr. Mitchell. MRI cervical spine concerning for cord compression. Given decadron 96mg IVPB x1. Neurosurgery consulted who agreed with steroids with possible OR to be discussed tomorrow. (14 Jun 2017 02:04)      PAST MEDICAL & SURGICAL HISTORY:  Cervical stenosis of spine  Hyperlipidemia  S/P cervical spinal fusion: 5/18/17      FAMILY HISTORY:  No pertinent family history in first degree relatives      SOCIAL HISTORY:  [ ] Denies Smoking, Alcohol, or Drug Use    HOME MEDICATIONS:   Please refer to initial HNP    PAIN HOME MEDICATIONS:    Allergies    No Known Allergies    Intolerances        PAIN MEDICATIONS:  acetaminophen   Tablet. 650milliGRAM(s) Oral every 6 hours PRN  oxyCODONE  5 mG/acetaminophen 325 mG 1Tablet(s) Oral every 4 hours PRN  oxyCODONE  5 mG/acetaminophen 325 mG 2Tablet(s) Oral every 6 hours PRN  diazepam    Tablet 5milliGRAM(s) Oral every 12 hours PRN  metoclopramide Injectable 10milliGRAM(s) IV Push once PRN    Heme:    Antibiotics:  ceFAZolin   IVPB 2000milliGRAM(s) IV Intermittent every 8 hours    Cardiovascular:  tamsulosin 0.4milliGRAM(s) Oral at bedtime    GI:  polyethylene glycol 3350 17Gram(s) Oral daily  docusate sodium 100milliGRAM(s) Oral three times a day  magnesium hydroxide Suspension 30milliLiter(s) Oral every 12 hours PRN  senna 2Tablet(s) Oral at bedtime  pantoprazole    Tablet 40milliGRAM(s) Oral before breakfast    Endocrine:  dextrose Gel 1Dose(s) Oral once PRN  glucagon  Injectable 1milliGRAM(s) IntraMuscular once PRN  atorvastatin 20milliGRAM(s) Oral at bedtime  dexamethasone  Injectable 4milliGRAM(s) IV Push every 6 hours  insulin lispro (HumaLOG) corrective regimen sliding scale  SubCutaneous Before meals and at bedtime  dextrose Gel 1Dose(s) Oral once PRN  dextrose 50% Injectable 12.5Gram(s) IV Push once  dextrose 50% Injectable 25Gram(s) IV Push once  glucagon  Injectable 1milliGRAM(s) IntraMuscular once PRN    All Other Medications:  dextrose 5%. 1000milliLiter(s) IV Continuous <Continuous>  sodium chloride 0.9%. 1000milliLiter(s) IV Continuous <Continuous>      REVIEW OF SYSTEMS:    CONSTITUTIONAL: No fever, weight loss, or fatigue  EYES: No eye pain, visual disturbances, or discharge  ENMT:  No difficulty hearing, tinnitus, vertigo; No sinus or throat pain  NECK: No pain or stiffness  BREASTS: No pain, masses, or nipple discharge  RESPIRATORY: No cough, wheezing, chills or hemoptysis; No shortness of breath  CARDIOVASCULAR: No chest pain, palpitations, dizziness, or leg swelling  GASTROINTESTINAL: No abdominal or epigastric pain. No nausea, vomiting, or hematemesis; No diarrhea or constipation. No melena or hematochezia.  GENITOURINARY: No dysuria, frequency, hematuria, or incontinence  NEUROLOGICAL: No headaches, memory loss, loss of strength, numbness, or tremors  SKIN: No itching, burning, rashes, or lesions   LYMPH NODES: No enlarged glands  ENDOCRINE: No heat or cold intolerance; No hair loss  MUSCULOSKELETAL: No joint pain or swelling; No muscle, back, or extremity pain  PSYCHIATRIC: No depression, anxiety, mood swings, or difficulty sleeping  HEME/LYMPH: No easy bruising, or bleeding gums  ALLERY AND IMMUNOLOGIC: No hives or eczema      Vital Signs Last 24 Hrs  T(C): 36.9, Max: 36.9 (06-15 @ 20:30)  T(F): 98.4, Max: 98.4 (06-15 @ 20:30)  HR: 68 (57 - 80)  BP: 113/76 (113/76 - 158/90)  BP(mean): --  RR: 20 (15 - 26)  SpO2: 95% (93% - 99%)    FUNCTIONAL ASSESSMENT:  PAIN SCORE AT REST:         SCALE USED: (1-10 VNRS)  Comment:  PAIN SCORE WITH ACTIVITY:         SCALE USED: (1-10 VNRS)  Comment:    PQRST:   Palliative:   Quality:       GENERAL: NAD, well-groomed, well-developed  HEAD:  Atraumatic, Normocephalic  EYES: EOMI, PERRLA, conjunctiva and sclera clear  NECK: Supple, ___ collar  NERVOUS SYSTEM:    Alert & Oriented X3, Good concentration;   Cranial nerves grossly intact  Motor Strength 5/5 B/L upper and lower extremities;   DTRs 2+ intact and symmetric  Sensation intact to LT in UE/LE in 3 dermatomes    Cervical: No facet tenderness. Negative Spurlings sign. Negative Queen's sign. Negative Brudzinski's sign. Negative Kernig's sign. Cervical ROM not assessed, s/p surgery and restricted head turning.  ROM  Cervical flexion: 50  Right lateral flexion: 45  Left lateral flexion: 45  Extension: 60  Left rotation: 80  Right rotation: 80    Lumbar: No lumbar tenderness. Negative straight leg raise. Negative crossed straight leg raise. Negative Demario's sign. Negative facet loading maneuvers. Lumbar ROM not assessed, s/p surgery and restricted head turning.  ROM  Trunk extension: 25  Trunk flexion: 90  Right rotation: 25  Left rotation: 25    CHEST/LUNG: Clear to auscultation bilaterally; No rales, rhonchi, wheezing, or rubs  HEART: Regular rate and rhythm; No murmurs, rubs, or gallops  ABDOMEN: Soft, Nontender, Nondistended; Bowel sounds present  EXTREMITIES:  2+ Peripheral Pulses, No clubbing, cyanosis, or edema  SKIN: No rashes or lesions      Cervical: No facet tenderness. Negative Spurlings sign. Negative Queen's sign. Negative Brudzinski's sign. Negative Kernig's sign.   ROM  Cervical flexion: 50  Right lateral flexion: 45  Left lateral flexion: 45  Extension: 60  Left rotation: 80  Right rotation: 80    Lumbar: No lumbar tenderness. Negative straight leg raise. Negative crossed straight leg raise. Negative Demario's sign. Negative facet loading maneuvers.   ROM  Trunk extension: 25  Trunk flexion: 90  Right rotation: 25  Left rotation: 25    LABS:                        13.7   18.6  )-----------( 332      ( 16 Jun 2017 05:12 )             40.6     06-16    138  |  102  |  28<H>  ----------------------------<  139<H>  4.2   |  24  |  1.10    Ca    9.0      16 Jun 2017 05:11  Phos  5.4     06-16  Mg     2.3     06-16            RADIOLOGY:    Drug Screen:        ASSESSMENT:       PLAN:   1. Opioids    Since yesterday 6am, pt has required:     PCA Setting:   - Opioids:   - Initial Bolus:   - Initial Demand:   - Lockout:   - Continuous Rate:   - 4 hour limit:     PLAN: C/w . Transition to . LA not required. Pt receiving adequete coverage. First step. For rescue dosing, will order .      2. Neuropathics  Pt currently denies neuropathic pain. No numbness/tingling/electric shock like sensation in LE/UE b.l.    PLAN: No indication for neuropathic agents.     3. Adjuvants  Pt ___ complaining of muscle spasms/cramping in neck/back. Tylenol 650mg q6h PRN for Mild Pain. Pt on/not on Percocet.     PLAN: C/w     4. Prophylactic:   Bowel regimen: Docusate Senna;    Nausea PRN: Zofran PRN for Nausea, pt does not c/o current    5. Functional Goals:   Pt will get OOB with PT today. Pt will resume previous level of activity without impairment from surgery.     6. Additional Consults:   None recommended.     7. Additional Labs/Imaging:   None recommended.     8. Follow up, Discharge Planning:   Patient is set for discharge to:   Discharge is pending:   Pain Management follow up plan: NEUROSURGERY PAIN MANAGEMENT PROGRESS NOTE    **PLAN**  - Dc IVP medications, minimal needs, denies Ms pain  - C/w Ambien qbedtime  - PT/OT to eval today    HPI:  62yo M w/ PMHx of HLD, h/o cervical cord compression s/p C5/C6 anterior decompression and fusion on 5/18/17 presents with weakness. Pt states that last week he started noticing his legs were more weak and his left foot started dragging. Yesterday, his weakness worsened dramatically to the point that he was unable to walk and get up without assistance. Since his surgery, he has been taking XS tylenol and oxycodone PRN for pain control. He denies urinary or fecal incontinence, with last BM 3-4 days ago. Denies fever, chills, nausea, vomiting, SOB, headache, diarrhea.     In ED, T 97.8F, HR 78, /86, RR 18, 99% on RA. Neurology consulted (Dr. Mitchell) as this patient is well-known to Dr. Mitchell. MRI cervical spine concerning for cord compression. Given decadron 96mg IVPB x1. Neurosurgery consulted who agreed with steroids with possible OR to be discussed tomorrow. (14 Jun 2017 02:04)      PAST MEDICAL & SURGICAL HISTORY:  Cervical stenosis of spine  Hyperlipidemia  S/P cervical spinal fusion: 5/18/17      FAMILY HISTORY:  No pertinent family history in first degree relatives      SOCIAL HISTORY:  [ ] Denies Smoking, Alcohol, or Drug Use    HOME MEDICATIONS:   Please refer to initial HNP    PAIN HOME MEDICATIONS:    Allergies    No Known Allergies    Intolerances        PAIN MEDICATIONS:  acetaminophen   Tablet. 650milliGRAM(s) Oral every 6 hours PRN  oxyCODONE  5 mG/acetaminophen 325 mG 1Tablet(s) Oral every 4 hours PRN  oxyCODONE  5 mG/acetaminophen 325 mG 2Tablet(s) Oral every 6 hours PRN  diazepam    Tablet 5milliGRAM(s) Oral every 12 hours PRN  metoclopramide Injectable 10milliGRAM(s) IV Push once PRN    Heme:    Antibiotics:  ceFAZolin   IVPB 2000milliGRAM(s) IV Intermittent every 8 hours    Cardiovascular:  tamsulosin 0.4milliGRAM(s) Oral at bedtime    GI:  polyethylene glycol 3350 17Gram(s) Oral daily  docusate sodium 100milliGRAM(s) Oral three times a day  magnesium hydroxide Suspension 30milliLiter(s) Oral every 12 hours PRN  senna 2Tablet(s) Oral at bedtime  pantoprazole    Tablet 40milliGRAM(s) Oral before breakfast    Endocrine:  dextrose Gel 1Dose(s) Oral once PRN  glucagon  Injectable 1milliGRAM(s) IntraMuscular once PRN  atorvastatin 20milliGRAM(s) Oral at bedtime  dexamethasone  Injectable 4milliGRAM(s) IV Push every 6 hours  insulin lispro (HumaLOG) corrective regimen sliding scale  SubCutaneous Before meals and at bedtime  dextrose Gel 1Dose(s) Oral once PRN  dextrose 50% Injectable 12.5Gram(s) IV Push once  dextrose 50% Injectable 25Gram(s) IV Push once  glucagon  Injectable 1milliGRAM(s) IntraMuscular once PRN    All Other Medications:  dextrose 5%. 1000milliLiter(s) IV Continuous <Continuous>  sodium chloride 0.9%. 1000milliLiter(s) IV Continuous <Continuous>      REVIEW OF SYSTEMS:  CONSTITUTIONAL: No fever, weight loss, or fatigue  EYES: No eye pain, visual disturbances, or discharge  ENMT:  No difficulty hearing, tinnitus, vertigo; No sinus or throat pain  NECK: No pain or stiffness  BREASTS: No pain, masses, or nipple discharge  RESPIRATORY: No cough, wheezing, chills or hemoptysis; No shortness of breath  CARDIOVASCULAR: No chest pain, palpitations, dizziness, or leg swelling  GASTROINTESTINAL: No abdominal or epigastric pain. No nausea, vomiting, or hematemesis; No diarrhea or constipation. No melena or hematochezia.  GENITOURINARY: No dysuria, frequency, hematuria, or incontinence  NEUROLOGICAL: No headaches, memory loss, loss of strength, numbness, or tremors  SKIN: No itching, burning, rashes, or lesions   LYMPH NODES: No enlarged glands  ENDOCRINE: No heat or cold intolerance; No hair loss  MUSCULOSKELETAL: Improvement No joint pain or swelling; No muscle, back, or extremity pain  PSYCHIATRIC: Difficulty sleeping overnight, anxious about       Vital Signs Last 24 Hrs  T(C): 36.9, Max: 36.9 (06-15 @ 20:30)  T(F): 98.4, Max: 98.4 (06-15 @ 20:30)  HR: 68 (57 - 80)  BP: 113/76 (113/76 - 158/90)  BP(mean): --  RR: 20 (15 - 26)  SpO2: 95% (93% - 99%)    FUNCTIONAL ASSESSMENT:  PAIN SCORE AT REST:         SCALE USED: (1-10 VNRS)  Comment:  PAIN SCORE WITH ACTIVITY:         SCALE USED: (1-10 VNRS)  Comment:    PQRST:   Palliative:   Quality:       GENERAL: NAD, well-groomed, well-developed  HEAD:  Atraumatic, Normocephalic  EYES: EOMI, PERRLA, conjunctiva and sclera clear  NECK: Supple, ___ collar  NERVOUS SYSTEM:    Alert & Oriented X3, Good concentration;   Cranial nerves grossly intact  Motor Strength 5/5 B/L upper and lower extremities;   DTRs 2+ intact and symmetric  Sensation intact to LT in UE/LE in 3 dermatomes    Cervical: No facet tenderness. Negative Spurlings sign. Negative Queen's sign. Negative Brudzinski's sign. Negative Kernig's sign. Cervical ROM not assessed, s/p surgery and restricted head turning.  ROM  Cervical flexion: 50  Right lateral flexion: 45  Left lateral flexion: 45  Extension: 60  Left rotation: 80  Right rotation: 80    Lumbar: No lumbar tenderness. Negative straight leg raise. Negative crossed straight leg raise. Negative Demario's sign. Negative facet loading maneuvers. Lumbar ROM not assessed, s/p surgery and restricted head turning.  ROM  Trunk extension: 25  Trunk flexion: 90  Right rotation: 25  Left rotation: 25    CHEST/LUNG: Clear to auscultation bilaterally; No rales, rhonchi, wheezing, or rubs  HEART: Regular rate and rhythm; No murmurs, rubs, or gallops  ABDOMEN: Soft, Nontender, Nondistended; Bowel sounds present  EXTREMITIES:  2+ Peripheral Pulses, No clubbing, cyanosis, or edema  SKIN: No rashes or lesions      Cervical: No facet tenderness. Negative Spurlings sign. Negative Queen's sign. Negative Brudzinski's sign. Negative Kernig's sign.   ROM  Cervical flexion: 50  Right lateral flexion: 45  Left lateral flexion: 45  Extension: 60  Left rotation: 80  Right rotation: 80    Lumbar: No lumbar tenderness. Negative straight leg raise. Negative crossed straight leg raise. Negative Demario's sign. Negative facet loading maneuvers.   ROM  Trunk extension: 25  Trunk flexion: 90  Right rotation: 25  Left rotation: 25    LABS:                        13.7   18.6  )-----------( 332      ( 16 Jun 2017 05:12 )             40.6     06-16    138  |  102  |  28<H>  ----------------------------<  139<H>  4.2   |  24  |  1.10    Ca    9.0      16 Jun 2017 05:11  Phos  5.4     06-16  Mg     2.3     06-16            RADIOLOGY:    Drug Screen:        ASSESSMENT:       PLAN:   1. Opioids    Since yesterday 6am, pt has required:     PCA Setting:   - Opioids:   - Initial Bolus:   - Initial Demand:   - Lockout:   - Continuous Rate:   - 4 hour limit:     PLAN: C/w . Transition to . LA not required. Pt receiving adequete coverage. First step. For rescue dosing, will order .      2. Neuropathics  Pt currently denies neuropathic pain. No numbness/tingling/electric shock like sensation in LE/UE b.l.    PLAN: No indication for neuropathic agents.     3. Adjuvants  Pt ___ complaining of muscle spasms/cramping in neck/back. Tylenol 650mg q6h PRN for Mild Pain. Pt on/not on Percocet.     PLAN: C/w     4. Prophylactic:   Bowel regimen: Docusate Senna;    Nausea PRN: Zofran PRN for Nausea, pt does not c/o current    5. Functional Goals:   Pt will get OOB with PT today. Pt will resume previous level of activity without impairment from surgery.     6. Additional Consults:   None recommended.     7. Additional Labs/Imaging:   None recommended.     8. Follow up, Discharge Planning:   Patient is set for discharge to:   Discharge is pending:   Pain Management follow up plan: NEUROSURGERY PAIN MANAGEMENT PROGRESS NOTE    **PLAN**  - Dc IVP medications, minimal needs, denies Ms pain  - C/w Ambien qbedtime  - PT/OT to eval today    HPI:  60yo M w/ PMHx of HLD, h/o cervical cord compression s/p C5/C6 anterior decompression and fusion on 5/18/17 presents with weakness. Pt states that last week he started noticing his legs were more weak and his left foot started dragging. Yesterday, his weakness worsened dramatically to the point that he was unable to walk and get up without assistance. Since his surgery, he has been taking XS tylenol and oxycodone PRN for pain control. He denies urinary or fecal incontinence, with last BM 3-4 days ago. Denies fever, chills, nausea, vomiting, SOB, headache, diarrhea.     In ED, T 97.8F, HR 78, /86, RR 18, 99% on RA. Neurology consulted (Dr. Mitchell) as this patient is well-known to Dr. Mitchell. MRI cervical spine concerning for cord compression. Given decadron 96mg IVPB x1. Neurosurgery consulted who agreed with steroids with possible OR to be discussed tomorrow. (14 Jun 2017 02:04)      PAST MEDICAL & SURGICAL HISTORY:  Cervical stenosis of spine  Hyperlipidemia  S/P cervical spinal fusion: 5/18/17      FAMILY HISTORY:  No pertinent family history in first degree relatives    HOME MEDICATIONS:   Please refer to initial HNP    PAIN HOME MEDICATIONS:  Oxycodone s/p ACDF    Allergies    No Known Allergies    Intolerances        PAIN MEDICATIONS:  acetaminophen   Tablet. 650milliGRAM(s) Oral every 6 hours PRN  oxyCODONE  5 mG/acetaminophen 325 mG 1Tablet(s) Oral every 4 hours PRN  oxyCODONE  5 mG/acetaminophen 325 mG 2Tablet(s) Oral every 6 hours PRN  diazepam    Tablet 5milliGRAM(s) Oral every 12 hours PRN  metoclopramide Injectable 10milliGRAM(s) IV Push once PRN    Heme:    Antibiotics:  ceFAZolin   IVPB 2000milliGRAM(s) IV Intermittent every 8 hours    Cardiovascular:  tamsulosin 0.4milliGRAM(s) Oral at bedtime    GI:  polyethylene glycol 3350 17Gram(s) Oral daily  docusate sodium 100milliGRAM(s) Oral three times a day  magnesium hydroxide Suspension 30milliLiter(s) Oral every 12 hours PRN  senna 2Tablet(s) Oral at bedtime  pantoprazole    Tablet 40milliGRAM(s) Oral before breakfast    Endocrine:  dextrose Gel 1Dose(s) Oral once PRN  glucagon  Injectable 1milliGRAM(s) IntraMuscular once PRN  atorvastatin 20milliGRAM(s) Oral at bedtime  dexamethasone  Injectable 4milliGRAM(s) IV Push every 6 hours  insulin lispro (HumaLOG) corrective regimen sliding scale  SubCutaneous Before meals and at bedtime  dextrose Gel 1Dose(s) Oral once PRN  dextrose 50% Injectable 12.5Gram(s) IV Push once  dextrose 50% Injectable 25Gram(s) IV Push once  glucagon  Injectable 1milliGRAM(s) IntraMuscular once PRN    All Other Medications:  dextrose 5%. 1000milliLiter(s) IV Continuous <Continuous>  sodium chloride 0.9%. 1000milliLiter(s) IV Continuous <Continuous>      REVIEW OF SYSTEMS:  CONSTITUTIONAL: No fever or fatigue  EYES: No eye pain, visual disturbances, or discharge  ENMT:  No difficulty hearing, tinnitus, vertigo; No sinus or throat pain  NECK: Mild pain/stiffness, non-bothersome  RESPIRATORY: No cough, wheezing, chills; No shortness of breath  CARDIOVASCULAR: No chest pain, palpitations, dizziness, or leg swelling  GASTROINTESTINAL: Passing gas, no BM. No abdominal or epigastric pain. No nausea, vomiting  NEUROLOGICAL: Vast improvement in strength post-op. No headaches, memory loss, no numbness/tremors  MSK: No msk pain, no joint pain.     Vital Signs Last 24 Hrs  T(C): 36.9, Max: 36.9 (06-15 @ 20:30)  T(F): 98.4, Max: 98.4 (06-15 @ 20:30)  HR: 68 (57 - 80)  BP: 113/76 (113/76 - 158/90)  BP(mean): --  RR: 20 (15 - 26)  SpO2: 95% (93% - 99%)    FUNCTIONAL ASSESSMENT:  PAIN SCORE AT REST:  Denies current     SCALE USED: (1-10 VNRS)  PAIN SCORE WITH ACTIVITY:    Denies current     SCALE USED: (1-10 VNRS)    PQRST:   Post op pain minimal, slight discomfort in throat.     GENERAL: NAD, well-groomed, well-developed  HEAD:  Atraumatic, Normocephalic  EYES: EOMI, PERRLA, conjunctiva and sclera clear  NECK: + [place] J collar  NERVOUS SYSTEM:    Alert & Oriented X3, Good concentration;   Cranial nerves grossly intact  Motor Strength 5/5 B/L in LE, 5-/5+ weakness dorsiflexion L leg. UE (triceps/biceps 5/5) hand  4+/5+ in LUE, 5-/5+ in RUE.   Sensation intact to LT in UE/LE in 3 dermatomes, + clonus b/l  Cervical: No facet tenderness. Pt in hard collar, no turning.  CHEST/LUNG: Clear to auscultation bilaterally; No rales, rhonchi, wheezing, or rubs  HEART: Regular rate and rhythm; No murmurs, rubs, or gallops  ABDOMEN: Soft, Nontender, Nondistended; Bowel sounds present  SKIN: No rashes or lesions    LABS:                        13.7   18.6  )-----------( 332      ( 16 Jun 2017 05:12 )             40.6     06-16    138  |  102  |  28<H>  ----------------------------<  139<H>  4.2   |  24  |  1.10    Ca    9.0      16 Jun 2017 05:11  Phos  5.4     06-16  Mg     2.3     06-16            RADIOLOGY:    Drug Screen:        ASSESSMENT:       PLAN:   1. Opioids    Since yesterday 6am, pt has required no opioids.     PLAN: C/w Percocet. No LA not required. Pt receiving adequate coverage. For breakthrough pain, IV Tylenol, no IVP dilaudid required.     2. Neuropathics  Pt currently denies neuropathic pain. No numbness/tingling/electric shock like sensation in LE/UE b.l.    PLAN: No indication for neuropathic agents.     3. Adjuvants  Pt not complaining of muscle spasms/cramping in neck/back. Ambien q bedtime requested for disturbed sleeping today. Tylenol 650mg q6h PRN for Mild Pain. Pt on Percocet.     PLAN: No need to dc with Valium. C/w Ambien q bedtime.     4. Prophylactic:   Bowel regimen: Docusate Senna;    Nausea PRN: Zofran PRN for Nausea, pt does not c/o current    5. Functional Goals:   Pt will get OOB with PT today. Pt will resume previous level of activity without impairment from surgery.     6. Additional Consults:   None recommended.     7. Additional Labs/Imaging:   None recommended.     8. Follow up, Discharge Planning:   Patient is set for discharge to: Home  Discharge is pending: PT/OT eval  Pain Management follow up plan: F/inpatient, no need for outpatient f/u

## 2017-06-16 NOTE — PROGRESS NOTE ADULT - SUBJECTIVE AND OBJECTIVE BOX
CC: cervical cord compression    Interval history: Mr. Wagner underwent surgical cervical cord decompression yesterday evening - specifically C5 and C6 corpectomy, C4/5 and C6/7 discectomy, cage placement and C4-7 anterior plate fusion.     Exam:  Gen: sitting up in bed, cervical collar and nasal cannula in place, no distress  MS: awake, alert, speech fluent, comprehension intact  CN: face symmetric, voice normal  Motor: deltoid, biceps 5/5, triceps 4/5 b/l, wrist extension 5 R 3 L, finger extension 4 R 2 L, finger flexion 5/5 b/l; hip flexion 4 R 3 L, ankle dorsiflexion 5 R 2 L, toe flexion / extension 2 L; finger tapping significantly slower on left than right. Increased tone left arm and leg compared to right  Reflexes: sustained clonus left ankle and left wrist  Gait: deferred    WBC 18.6, Hb 13.7, A1C 5.9%, Na 138, BUN 28, Cr 1.10

## 2017-06-16 NOTE — PROGRESS NOTE ADULT - SUBJECTIVE AND OBJECTIVE BOX
=================================  NEUROCRITICAL CARE ATTENDING NOTE  =================================    JG JARVIS   MRN-[id number]  Summary:  61Mwith SCC, s/p corpectomy, s/p anterior fusion mid-May at [facility], c/o weakness after surgery, R LE, readmitted, noted more cord compression in MRI - OR. pre-operatively 0/5 L LE, post-op now has some movements noted on L LE  60yo M w/ PMHx of HLD, h/o cervical cord compression s/p C5/C6 anterior decompression and fusion on 5/18/17 presents with weakness. Pt states that last week he started noticing his legs were more weak and his left foot started dragging. Yesterday, his weakness worsened dramatically to the point that he was unable to walk and get up without assistance. Since his surgery, he has been taking XS tylenol and oxycodone PRN for pain control. He denies urinary or fecal incontinence, with last BM 3-4 days ago. Denies fever, chills, nausea, vomiting, SOB, headache, diarrhea.     In ED, T 97.8F, HR 78, /86, RR 18, 99% on RA. Neurology consulted (Dr. Mitchell) as this patient is well-known to Dr. Mitchell. MRI cervical spine concerning for cord compression. Given decadron 96mg IVPB x1. Neurosurgery consulted who agreed with steroids with possible OR to be discussed tomorrow. (14 Jun 2017 02:04)  Overnight Events: stayed in PACU overnight    PAST MEDICAL & SURGICAL HISTORY: Cervical stenosis of spine Hyperlipidemia S/P cervical spinal fusion: 5/18/17  NKDA  Home meds: atorvastatin    PHYSICAL EXAMINATION  T(C): , Max: 36.9 (06-15 @ 20:30) HR: 65 (57 - 80) BP: 124/85 (124/85 - 158/90) RR: 20 (15 - 26) SpO2: 96% (93% - 99%)  NEUROLOGIC EXAMINATION:  Patient is awake, alert, fully oriented, pupils 2-3mm equal and briskly reactive to light, EOMs intact, muscle strength 5/5 on all 4 extremities  GENERAL:  not intubated, not in cardiorespiratory distress  EENT: anicteric  CARDIOVASC:  (+) S1 S2, normal rate and regular rhythm  PULMONARY:  clear to auscultation bilaterally  ABDOMEN:  soft, nontender, with normoactive bowel sounds  EXTREMITIES:  no edema  SKIN:  no rash    I & Os for 24h ending 06-16-17  IN: 1280 ml / OUT: 1495 ml / NET: -215 ml    LABS:  CAPILLARY BLOOD GLUCOSE  120 (16 Jun 2017 11:00) 152 (15 Nikolas 2017 11:19)    (21)      13.7   18.6  )-----------( 332      ( 16 Jun 2017 05:12 )             40.6     138  |  102  |  28<H>  ----------------------------<  139<H>  4.2   |  24  |  1.10 (1.3, 0.8)    Ca    9.0      16 Jun 2017 05:11  Phos  5.4     06-16  Mg     2.3     06-16    Bacteriology:  CSF studies:  EEG:  Neuroimaging:  Other imaging:    MEDICATIONS:  MEDICATIONS  (STANDING):  dextrose 5%. 1000milliLiter(s) IV Continuous <Continuous>  polyethylene glycol 3350 17Gram(s) Oral daily  atorvastatin 20milliGRAM(s) Oral at bedtime  sodium chloride 0.9%. 1000milliLiter(s) IV Continuous <Continuous>  dexamethasone  Injectable 4milliGRAM(s) IV Push every 6 hours  ceFAZolin   IVPB 2000milliGRAM(s) IV Intermittent every 8 hours  docusate sodium 100milliGRAM(s) Oral three times a day  senna 2Tablet(s) Oral at bedtime  insulin lispro (HumaLOG) corrective regimen sliding scale  SubCutaneous Before meals and at bedtime  dextrose 50% Injectable 12.5Gram(s) IV Push once  dextrose 50% Injectable 25Gram(s) IV Push once  pantoprazole    Tablet 40milliGRAM(s) Oral before breakfast    MEDICATIONS  (PRN):  dextrose Gel 1Dose(s) Oral once PRN Blood Glucose LESS THAN 70 milliGRAM(s)/deciliter  glucagon  Injectable 1milliGRAM(s) IntraMuscular once PRN Glucose LESS THAN 70 milligrams/deciliter  acetaminophen   Tablet. 650milliGRAM(s) Oral every 6 hours PRN Mild Pain (1 - 3)  oxyCODONE  5 mG/acetaminophen 325 mG 1Tablet(s) Oral every 4 hours PRN Moderate Pain  oxyCODONE  5 mG/acetaminophen 325 mG 2Tablet(s) Oral every 6 hours PRN Severe Pain  HYDROmorphone  Injectable 1milliGRAM(s) IV Push every 1 hour PRN break through pain  diazepam    Tablet 5milliGRAM(s) Oral every 12 hours PRN Anxiety  metoclopramide Injectable 10milliGRAM(s) IV Push once PRN Nausea and/or Vomiting  magnesium hydroxide Suspension 30milliLiter(s) Oral every 12 hours PRN Constipation  dextrose Gel 1Dose(s) Oral once PRN Blood Glucose LESS THAN 70 milliGRAM(s)/deciliter  glucagon  Injectable 1milliGRAM(s) IntraMuscular once PRN Glucose LESS THAN 70 milligrams/deciliter    IV FLUIDS: NS@100cc/hr  DRIPS:  DIET: regular  Lines / Drains:  Neli Han    CODE STATUS:  full code                       GOALS OF CARE:  aggressive                      DISPOSITION:  ICU =================================  NEUROCRITICAL CARE ATTENDING NOTE  =================================    JG JARVIS   MRN-[id number]  Summary:  61Mwith SCC, s/p corpectomy, s/p anterior fusion mid-May at [facility], c/o weakness after surgery, R LE, readmitted, noted more cord compression in MRI - OR. pre-operatively 0/5 L LE, post-op now has some movements noted on L LE  Overnight Events: stayed in PACU overnight    PAST MEDICAL & SURGICAL HISTORY: Cervical stenosis of spine Hyperlipidemia S/P cervical spinal fusion: 17  NKDA  Home meds: atorvastatin    PHYSICAL EXAMINATION  T(C): , Max: 36.9 (06-15 @ 20:30) HR: 65 (57 - 80) BP: 124/85 (124/85 - 158/90) RR: 20 (15 - 26) SpO2: 96% (93% - 99%)  NEUROLOGIC EXAMINATION:  Patient is awake, alert, fully oriented, pupils 3mm equal and briskly reactive to light, EOMs intact, B UE 4/5 R LE 4/5 L LE 3/5 proximally 2/5 distally, sensation intact  GENERAL:  not intubated, not in cardiorespiratory distress  EENT: anicteric  CARDIOVASC:  (+) S1 S2, normal rate and regular rhythm  PULMONARY:  clear to auscultation bilaterally  ABDOMEN:  soft, nontender, with normoactive bowel sounds  EXTREMITIES:  no edema  SKIN:  no rash    I & Os for 24h ending 17  IN: 1280 ml / OUT: 1495 ml / NET: -215 ml    LABS:  CAPILLARY BLOOD GLUCOSE  120 (2017 11:00) 152 (15 Nikolas 2017 11:19)    (21)      13.7   18.6  )-----------( 332      ( 2017 05:12 )             40.6     138  |  102  |  28<H>  ----------------------------<  139<H>  4.2   |  24  |  1.10 (1.3, 0.8)    Ca    9.0      2017 05:11  Phos  5.4     06-16  Mg     2.3     06-16    Bacteriology:  CSF studies:  EEG:  Neuroimagin/1  Other imaging:    MEDICATIONS: miralax atorvastatin 20mg HS dexamethasone 4mg IV q6h cefazolin 2g q8h docusate senna pantoprazole 40 percocet dilaudid diazepam 5mg PRN anxiety     IV FLUIDS: NS@100cc/hr  DRIPS:  DIET: regular  Lines / Drains:  Neli Crockerey    CODE STATUS:  full code                       GOALS OF CARE:  aggressive                      DISPOSITION:  ICU =================================  NEUROCRITICAL CARE ATTENDING NOTE  =================================    JG JARVIS   MRN-[id number]  Summary:  61Mwith SCC, s/p corpectomy, s/p anterior fusion mid-May at [facility], c/o weakness after surgery, R LE, readmitted, noted more cord compression in MRI - OR. pre-operatively 0/5 L LE, post-op now has some movements noted on L LE  Overnight Events: stayed in PACU overnight    PAST MEDICAL & SURGICAL HISTORY: Cervical stenosis of spine Hyperlipidemia S/P cervical spinal fusion: 17  NKDA  Home meds: atorvastatin    PHYSICAL EXAMINATION  T(C): , Max: 36.9 (06-15 @ 20:30) HR: 65 (57 - 80) BP: 124/85 (124/85 - 158/90) RR: 20 (15 - 26) SpO2: 96% (93% - 99%)  NEUROLOGIC EXAMINATION:  Patient is awake, alert, fully oriented, pupils 3mm equal and briskly reactive to light, EOMs intact, B UE 4/5 R LE 4/5 L LE 3/5 proximally 2/5 distally, sensation intact  GENERAL:  not intubated, not in cardiorespiratory distress  EENT: anicteric  CARDIOVASC:  (+) S1 S2, normal rate and regular rhythm  PULMONARY:  clear to auscultation bilaterally  ABDOMEN:  soft, nontender, with normoactive bowel sounds  EXTREMITIES:  no edema  SKIN:  no rash    I & Os for 24h ending 17  IN: 1280 ml / OUT: 1495 ml / NET: -215 ml    LABS:  CAPILLARY BLOOD GLUCOSE  120 (2017 11:00) 152 (15 Nikolas 2017 11:19)    (21)      13.7   18.6  )-----------( 332      ( 2017 05:12 )             40.6     138  |  102  |  28<H>  ----------------------------<  139<H>  4.2   |  24  |  1.10 (1.3, 0.8)    Ca    9.0      2017 05:11  Phos  5.4     -16  Mg     2.3     -16    Bacteriology:  CSF studies:  EEG:  Neuroimagin/14 MRI: no abnormal signal or contour of thoracic SC, degenerative disc disease, mult osseous hemangiomas    s/p C5-6 ant cervical spinal fusion, severe stenosis of the spinal canal and bilatearl neural foramina at C5-6 and C6-7, mod to sev stenosis spinal canal, severe stenosis of bilat neural foramina at C3-4 and C4-5  Other imaging:    MEDICATIONS: miralax atorvastatin 20mg HS dexamethasone 4mg IV q6h cefazolin 2g q8h docusate senna pantoprazole 40 percocet dilaudid diazepam 5mg PRN anxiety     IV FLUIDS: NS@100cc/hr  DRIPS:  DIET: regular  Lines / Drains:  Neli Crockerey    CODE STATUS:  full code                       GOALS OF CARE:  aggressive                      DISPOSITION:  ICU

## 2017-06-17 LAB
ANION GAP SERPL CALC-SCNC: 12 MMOL/L — SIGNIFICANT CHANGE UP (ref 5–17)
BUN SERPL-MCNC: 27 MG/DL — HIGH (ref 7–23)
CALCIUM SERPL-MCNC: 8.7 MG/DL — SIGNIFICANT CHANGE UP (ref 8.4–10.5)
CHLORIDE SERPL-SCNC: 99 MMOL/L — SIGNIFICANT CHANGE UP (ref 96–108)
CO2 SERPL-SCNC: 24 MMOL/L — SIGNIFICANT CHANGE UP (ref 22–31)
CREAT SERPL-MCNC: 0.8 MG/DL — SIGNIFICANT CHANGE UP (ref 0.5–1.3)
GLUCOSE SERPL-MCNC: 129 MG/DL — HIGH (ref 70–99)
HCT VFR BLD CALC: 40.6 % — SIGNIFICANT CHANGE UP (ref 39–50)
HGB BLD-MCNC: 13.5 G/DL — SIGNIFICANT CHANGE UP (ref 13–17)
LYMPHOCYTES # BLD AUTO: 5.3 % — LOW (ref 13–44)
MCHC RBC-ENTMCNC: 29.2 PG — SIGNIFICANT CHANGE UP (ref 27–34)
MCHC RBC-ENTMCNC: 33.3 G/DL — SIGNIFICANT CHANGE UP (ref 32–36)
MCV RBC AUTO: 87.7 FL — SIGNIFICANT CHANGE UP (ref 80–100)
MONOCYTES NFR BLD AUTO: 7.9 % — SIGNIFICANT CHANGE UP (ref 2–14)
NEUTROPHILS NFR BLD AUTO: 86.8 % — HIGH (ref 43–77)
PLATELET # BLD AUTO: 281 K/UL — SIGNIFICANT CHANGE UP (ref 150–400)
POTASSIUM SERPL-MCNC: 4.4 MMOL/L — SIGNIFICANT CHANGE UP (ref 3.5–5.3)
POTASSIUM SERPL-SCNC: 4.4 MMOL/L — SIGNIFICANT CHANGE UP (ref 3.5–5.3)
RBC # BLD: 4.63 M/UL — SIGNIFICANT CHANGE UP (ref 4.2–5.8)
RBC # FLD: 12.3 % — SIGNIFICANT CHANGE UP (ref 10.3–16.9)
SODIUM SERPL-SCNC: 135 MMOL/L — SIGNIFICANT CHANGE UP (ref 135–145)
WBC # BLD: 14.5 K/UL — HIGH (ref 3.8–10.5)
WBC # FLD AUTO: 14.5 K/UL — HIGH (ref 3.8–10.5)

## 2017-06-17 PROCEDURE — 93970 EXTREMITY STUDY: CPT | Mod: 26

## 2017-06-17 RX ORDER — ENOXAPARIN SODIUM 100 MG/ML
40 INJECTION SUBCUTANEOUS AT BEDTIME
Qty: 0 | Refills: 0 | Status: DISCONTINUED | OUTPATIENT
Start: 2017-06-17 | End: 2017-06-21

## 2017-06-17 RX ADMIN — SENNA PLUS 2 TABLET(S): 8.6 TABLET ORAL at 22:41

## 2017-06-17 RX ADMIN — ATORVASTATIN CALCIUM 20 MILLIGRAM(S): 80 TABLET, FILM COATED ORAL at 22:41

## 2017-06-17 RX ADMIN — PANTOPRAZOLE SODIUM 40 MILLIGRAM(S): 20 TABLET, DELAYED RELEASE ORAL at 06:22

## 2017-06-17 RX ADMIN — Medication 100 MILLIGRAM(S): at 06:23

## 2017-06-17 RX ADMIN — Medication 4: at 22:41

## 2017-06-17 RX ADMIN — MAGNESIUM HYDROXIDE 30 MILLILITER(S): 400 TABLET, CHEWABLE ORAL at 06:37

## 2017-06-17 RX ADMIN — Medication 4 MILLIGRAM(S): at 12:41

## 2017-06-17 RX ADMIN — ENOXAPARIN SODIUM 40 MILLIGRAM(S): 100 INJECTION SUBCUTANEOUS at 22:40

## 2017-06-17 RX ADMIN — Medication 4 MILLIGRAM(S): at 23:26

## 2017-06-17 RX ADMIN — Medication 4 MILLIGRAM(S): at 06:23

## 2017-06-17 RX ADMIN — POLYETHYLENE GLYCOL 3350 17 GRAM(S): 17 POWDER, FOR SOLUTION ORAL at 12:41

## 2017-06-17 RX ADMIN — Medication 2: at 12:42

## 2017-06-17 RX ADMIN — Medication 4 MILLIGRAM(S): at 18:25

## 2017-06-17 RX ADMIN — Medication 4 MILLIGRAM(S): at 00:26

## 2017-06-17 RX ADMIN — Medication 100 MILLIGRAM(S): at 22:40

## 2017-06-17 RX ADMIN — Medication 100 MILLIGRAM(S): at 12:41

## 2017-06-17 RX ADMIN — TAMSULOSIN HYDROCHLORIDE 0.4 MILLIGRAM(S): 0.4 CAPSULE ORAL at 22:41

## 2017-06-17 NOTE — OCCUPATIONAL THERAPY INITIAL EVALUATION ADULT - PERSONAL SAFETY AND JUDGMENT, REHAB EVAL
Patient reports understanding of importance of further rehabilitation prior to safe discharge home. Patient with decreased awareness of current deficits and limitations.

## 2017-06-17 NOTE — PHYSICAL THERAPY INITIAL EVALUATION ADULT - ACTIVE RANGE OF MOTION EXAMINATION, REHAB EVAL
Right UE Active ROM was WFL (within functional limits)/Right LE Active ROM was WFL (within functional limits)/Left UE Active ROM was WFL (within functional limits)/LLE is WFL except for left DF limited by weakness

## 2017-06-17 NOTE — OCCUPATIONAL THERAPY INITIAL EVALUATION ADULT - PERTINENT HX OF CURRENT PROBLEM, REHAB EVAL
60yo M w/ PMHx of HLD, h/o cervical cord compression s/p C5/C6 anterior decompression and fusion on 5/18/17 presents with LE weakness. Patient now POD #2 s/p s/p 6/15 corpectomy of C5-C6 and fusion of C4-C7 60yo RHD male w/ PMHx of HLD, h/o cervical cord compression s/p C5/C6 anterior decompression and fusion on 5/18/17 presents with LE weakness. Patient now POD #2 s/p s/p 6/15 corpectomy of C5-C6 and fusion of C4-C7

## 2017-06-17 NOTE — PHYSICAL THERAPY INITIAL EVALUATION ADULT - CRITERIA FOR SKILLED THERAPEUTIC INTERVENTIONS
rehab potential/anticipated discharge recommendation/impairments found/anticipated equipment needs at discharge

## 2017-06-17 NOTE — OCCUPATIONAL THERAPY INITIAL EVALUATION ADULT - MANUAL MUSCLE TESTING RESULTS, REHAB EVAL
MMT not formally assessed 2/2 acute cervical surgery. L distal strength deficits noted, intrinsic weakness noted in thumb opposition, digit abduction/adduction. R  strength > L  strength

## 2017-06-17 NOTE — PROGRESS NOTE ADULT - SUBJECTIVE AND OBJECTIVE BOX
HPI:  no acute events overnight  pain controlled  denies any new complaints, weakness, numbness or tingling    OVERNIGHT EVENTS:  Vital Signs Last 24 Hrs  T(C): 36.6, Max: 37.1 (06-16 @ 13:00)  T(F): 97.8, Max: 98.7 (06-16 @ 13:00)  HR: 60 (59 - 79)  BP: 149/86 (113/76 - 158/80)  BP(mean): --  RR: 16 (16 - 20)  SpO2: 96% (93% - 97%)    I&O's Summary  I & Os for 24h ending 16 Jun 2017 07:00  =============================================  IN: 1280 ml / OUT: 1495 ml / NET: -215 ml    I & Os for current day (as of 17 Jun 2017 05:42)  =============================================  IN: 2120 ml / OUT: 2190 ml / NET: -70 ml      PHYSICAL EXAM:  Neurological:  A&O x 3, appears comfortable  [place] collar intact  incision site c/d/i  b/u UE 5/5 throughout  +ve downey  LLE 3/5 proximal, 2/5 distal RLE 5/5     TUBES/LINES:  hernandez odell    DIET:  [] NPO  [x] Mechanical  [] Tube feeds    LABS:                        13.7   18.6  )-----------( 332      ( 16 Jun 2017 05:12 )             40.6     06-16    138  |  102  |  28<H>  ----------------------------<  139<H>  4.2   |  24  |  1.10    Ca    9.0      16 Jun 2017 05:11  Phos  5.4     06-16  Mg     2.3     06-16              CAPILLARY BLOOD GLUCOSE  141 (16 Jun 2017 23:03)  112 (16 Jun 2017 17:33)  120 (16 Jun 2017 11:00)      Drug Levels: [] N/A    CSF Analysis: [] N/A      Allergies    No Known Allergies    Intolerances      MEDICATIONS:  Antibiotics:    Neuro:  acetaminophen   Tablet. 650milliGRAM(s) Oral every 6 hours PRN  oxyCODONE  5 mG/acetaminophen 325 mG 1Tablet(s) Oral every 4 hours PRN  oxyCODONE  5 mG/acetaminophen 325 mG 2Tablet(s) Oral every 6 hours PRN  diazepam    Tablet 5milliGRAM(s) Oral every 12 hours PRN  metoclopramide Injectable 10milliGRAM(s) IV Push once PRN  zolpidem 5milliGRAM(s) Oral at bedtime PRN    Anticoagulation:    OTHER:  dextrose Gel 1Dose(s) Oral once PRN  glucagon  Injectable 1milliGRAM(s) IntraMuscular once PRN  polyethylene glycol 3350 17Gram(s) Oral daily  atorvastatin 20milliGRAM(s) Oral at bedtime  dexamethasone  Injectable 4milliGRAM(s) IV Push every 6 hours  docusate sodium 100milliGRAM(s) Oral three times a day  senna 2Tablet(s) Oral at bedtime  insulin lispro (HumaLOG) corrective regimen sliding scale  SubCutaneous Before meals and at bedtime  dextrose Gel 1Dose(s) Oral once PRN  dextrose 50% Injectable 12.5Gram(s) IV Push once  dextrose 50% Injectable 25Gram(s) IV Push once  glucagon  Injectable 1milliGRAM(s) IntraMuscular once PRN  pantoprazole    Tablet 40milliGRAM(s) Oral before breakfast  tamsulosin 0.4milliGRAM(s) Oral at bedtime  magnesium hydroxide Suspension 30milliLiter(s) Oral daily PRN    IVF:  dextrose 5%. 1000milliLiter(s) IV Continuous <Continuous>    CULTURES:    RADIOLOGY & ADDITIONAL TESTS:      ASSESSMENT:  62y Male s/p anterior cervical corpectomy and fusion POD 2 neuro improving    PLAN:  NEURO:  pain contro  neuro checks  enc IS use, oob   progress with PT/OT  cont collar  diet + bowel regimen  decadron per Dr. Fournier  f/u am labs  plan to dc cass when more mobile  SCDs  d/w Dr. Fournier

## 2017-06-17 NOTE — PHYSICAL THERAPY INITIAL EVALUATION ADULT - PERTINENT HX OF CURRENT PROBLEM, REHAB EVAL
Per EMR, 62yo M w/ PMHx of HLD, h/o cervical cord compression s/p C5/C6 anterior decompression and fusion on 5/18/17 presents with weakness. Pt states that last week he started noticing his legs were more weak and his left foot started dragging. Yesterday, his weakness worsened dramatically to the point that he was unable to walk and get up without assistance.

## 2017-06-17 NOTE — OCCUPATIONAL THERAPY INITIAL EVALUATION ADULT - GENERAL OBSERVATIONS, REHAB EVAL
Communicated with JAYA Strong prior to session. Patient received supine with HOB elevated, +cervical collar, +EKG, +B SCD. Patient denies pain, 0/10. Patient agreeable to session.

## 2017-06-17 NOTE — OCCUPATIONAL THERAPY INITIAL EVALUATION ADULT - NS ASR FOLLOW COMMAND OT EVAL
able to follow multistep instructions/Slightly impulsive at times, requires verbal cues to follow directions rather than attempt to perform tasks without notice to therapist/75% of the time

## 2017-06-18 LAB
ANION GAP SERPL CALC-SCNC: 14 MMOL/L — SIGNIFICANT CHANGE UP (ref 5–17)
BUN SERPL-MCNC: 21 MG/DL — SIGNIFICANT CHANGE UP (ref 7–23)
CALCIUM SERPL-MCNC: 9.2 MG/DL — SIGNIFICANT CHANGE UP (ref 8.4–10.5)
CHLORIDE SERPL-SCNC: 98 MMOL/L — SIGNIFICANT CHANGE UP (ref 96–108)
CO2 SERPL-SCNC: 25 MMOL/L — SIGNIFICANT CHANGE UP (ref 22–31)
CREAT SERPL-MCNC: 0.7 MG/DL — SIGNIFICANT CHANGE UP (ref 0.5–1.3)
GLUCOSE SERPL-MCNC: 114 MG/DL — HIGH (ref 70–99)
HCT VFR BLD CALC: 42.8 % — SIGNIFICANT CHANGE UP (ref 39–50)
HGB BLD-MCNC: 14.2 G/DL — SIGNIFICANT CHANGE UP (ref 13–17)
LYMPHOCYTES # BLD AUTO: 7.2 % — LOW (ref 13–44)
MCHC RBC-ENTMCNC: 28.9 PG — SIGNIFICANT CHANGE UP (ref 27–34)
MCHC RBC-ENTMCNC: 33.2 G/DL — SIGNIFICANT CHANGE UP (ref 32–36)
MCV RBC AUTO: 87 FL — SIGNIFICANT CHANGE UP (ref 80–100)
MONOCYTES NFR BLD AUTO: 6.9 % — SIGNIFICANT CHANGE UP (ref 2–14)
NEUTROPHILS NFR BLD AUTO: 85.9 % — HIGH (ref 43–77)
PLATELET # BLD AUTO: 292 K/UL — SIGNIFICANT CHANGE UP (ref 150–400)
POTASSIUM SERPL-MCNC: 4.3 MMOL/L — SIGNIFICANT CHANGE UP (ref 3.5–5.3)
POTASSIUM SERPL-SCNC: 4.3 MMOL/L — SIGNIFICANT CHANGE UP (ref 3.5–5.3)
RBC # BLD: 4.92 M/UL — SIGNIFICANT CHANGE UP (ref 4.2–5.8)
RBC # FLD: 12.3 % — SIGNIFICANT CHANGE UP (ref 10.3–16.9)
SODIUM SERPL-SCNC: 137 MMOL/L — SIGNIFICANT CHANGE UP (ref 135–145)
WBC # BLD: 11.5 K/UL — HIGH (ref 3.8–10.5)
WBC # FLD AUTO: 11.5 K/UL — HIGH (ref 3.8–10.5)

## 2017-06-18 PROCEDURE — 72125 CT NECK SPINE W/O DYE: CPT | Mod: 26

## 2017-06-18 RX ORDER — DEXAMETHASONE 0.5 MG/5ML
4 ELIXIR ORAL EVERY 8 HOURS
Qty: 0 | Refills: 0 | Status: DISCONTINUED | OUTPATIENT
Start: 2017-06-18 | End: 2017-06-20

## 2017-06-18 RX ORDER — LACTULOSE 10 G/15ML
20 SOLUTION ORAL ONCE
Qty: 0 | Refills: 0 | Status: COMPLETED | OUTPATIENT
Start: 2017-06-18 | End: 2017-06-19

## 2017-06-18 RX ADMIN — POLYETHYLENE GLYCOL 3350 17 GRAM(S): 17 POWDER, FOR SOLUTION ORAL at 08:54

## 2017-06-18 RX ADMIN — Medication 2: at 22:08

## 2017-06-18 RX ADMIN — Medication 4 MILLIGRAM(S): at 22:07

## 2017-06-18 RX ADMIN — Medication 2: at 12:30

## 2017-06-18 RX ADMIN — ENOXAPARIN SODIUM 40 MILLIGRAM(S): 100 INJECTION SUBCUTANEOUS at 22:07

## 2017-06-18 RX ADMIN — PANTOPRAZOLE SODIUM 40 MILLIGRAM(S): 20 TABLET, DELAYED RELEASE ORAL at 06:30

## 2017-06-18 RX ADMIN — MAGNESIUM HYDROXIDE 30 MILLILITER(S): 400 TABLET, CHEWABLE ORAL at 18:13

## 2017-06-18 RX ADMIN — Medication 100 MILLIGRAM(S): at 06:29

## 2017-06-18 RX ADMIN — Medication 4 MILLIGRAM(S): at 13:15

## 2017-06-18 RX ADMIN — SENNA PLUS 2 TABLET(S): 8.6 TABLET ORAL at 22:07

## 2017-06-18 RX ADMIN — Medication 4 MILLIGRAM(S): at 06:29

## 2017-06-18 RX ADMIN — Medication 100 MILLIGRAM(S): at 13:15

## 2017-06-18 RX ADMIN — Medication 100 MILLIGRAM(S): at 22:07

## 2017-06-18 RX ADMIN — ATORVASTATIN CALCIUM 20 MILLIGRAM(S): 80 TABLET, FILM COATED ORAL at 22:07

## 2017-06-18 NOTE — PROGRESS NOTE ADULT - SUBJECTIVE AND OBJECTIVE BOX
Neurosurgery PA note  S/P day 2 C4-C7 fusion, C6 corpectomy. Patient doing well without complaint.   Admits to minimal incisional pain.  Got OOb with PT using walker.    T(C): 36.4, Max: 36.8 (06-17 @ 09:28)  HR: 66 (60 - 75)  BP: 144/89 (135/81 - 159/93)  RR: 16 (16 - 18)  SpO2: 94% (94% - 97%)  Wt(kg): --    Exam:A&O x 3, appears comfortable  [place] collar in place  incision site c/d/i  b/u UE 5/5 throughout  +ve downey  LLE 3/5 proximal, 2/5 distal RLE 5/5     CBC Full  -  ( 17 Jun 2017 06:36 )  WBC Count : 14.5 K/uL  Hemoglobin : 13.5 g/dL  Hematocrit : 40.6 %  Platelet Count - Automated : 281 K/uL  Mean Cell Volume : 87.7 fL  Mean Cell Hemoglobin : 29.2 pg  Mean Cell Hemoglobin Concentration : 33.3 g/dL  Auto Neutrophil # : x  Auto Lymphocyte # : x  Auto Monocyte # : x  Auto Eosinophil # : x  Auto Basophil # : x  Auto Neutrophil % : 86.8 %  Auto Lymphocyte % : 5.3 %  Auto Monocyte % : 7.9 %  Auto Eosinophil % : x  Auto Basophil % : x    06-17    135  |  99  |  27<H>  ----------------------------<  129<H>  4.4   |  24  |  0.80    Ca    8.7      17 Jun 2017 06:36  Phos  5.4     06-16  Mg     2.3     06-16    Wound: dry and clean no edema, no erythema    Imaging: CT cervical spine today.    Assessment/Plan: 62 male s/p day 3 anterior cervical fusion. doing well  Plan: c- spine CT scan  PT/OT. Ambulate  Rehab placement  d/W. Dr. Fournier.

## 2017-06-18 NOTE — DIETITIAN INITIAL EVALUATION ADULT. - ENERGY NEEDS
BMI:30.3,IBW:160lbs+/-10%,128% of IBW.Skin intact(surgical wound).No N/V/D.Complaints of some pain from fusion.

## 2017-06-19 ENCOUNTER — TRANSCRIPTION ENCOUNTER (OUTPATIENT)
Age: 62
End: 2017-06-19

## 2017-06-19 PROCEDURE — 99232 SBSQ HOSP IP/OBS MODERATE 35: CPT

## 2017-06-19 RX ORDER — ZALEPLON 10 MG
5 CAPSULE ORAL AT BEDTIME
Qty: 0 | Refills: 0 | Status: DISCONTINUED | OUTPATIENT
Start: 2017-06-19 | End: 2017-06-21

## 2017-06-19 RX ADMIN — ENOXAPARIN SODIUM 40 MILLIGRAM(S): 100 INJECTION SUBCUTANEOUS at 21:50

## 2017-06-19 RX ADMIN — Medication 100 MILLIGRAM(S): at 21:50

## 2017-06-19 RX ADMIN — Medication 100 MILLIGRAM(S): at 06:51

## 2017-06-19 RX ADMIN — LACTULOSE 20 GRAM(S): 10 SOLUTION ORAL at 06:51

## 2017-06-19 RX ADMIN — Medication 5 MILLIGRAM(S): at 01:15

## 2017-06-19 RX ADMIN — Medication 4 MILLIGRAM(S): at 21:50

## 2017-06-19 RX ADMIN — SENNA PLUS 2 TABLET(S): 8.6 TABLET ORAL at 21:50

## 2017-06-19 RX ADMIN — Medication 4 MILLIGRAM(S): at 13:29

## 2017-06-19 RX ADMIN — POLYETHYLENE GLYCOL 3350 17 GRAM(S): 17 POWDER, FOR SOLUTION ORAL at 13:30

## 2017-06-19 RX ADMIN — TAMSULOSIN HYDROCHLORIDE 0.4 MILLIGRAM(S): 0.4 CAPSULE ORAL at 21:50

## 2017-06-19 RX ADMIN — PANTOPRAZOLE SODIUM 40 MILLIGRAM(S): 20 TABLET, DELAYED RELEASE ORAL at 06:51

## 2017-06-19 RX ADMIN — Medication 4 MILLIGRAM(S): at 06:51

## 2017-06-19 RX ADMIN — MAGNESIUM HYDROXIDE 30 MILLILITER(S): 400 TABLET, CHEWABLE ORAL at 18:20

## 2017-06-19 RX ADMIN — Medication 100 MILLIGRAM(S): at 13:29

## 2017-06-19 RX ADMIN — ATORVASTATIN CALCIUM 20 MILLIGRAM(S): 80 TABLET, FILM COATED ORAL at 21:50

## 2017-06-19 NOTE — DISCHARGE NOTE ADULT - CARE PLAN
Principal Discharge DX:	Cervical cord compression with myelopathy  Goal:	Return to previous level of activity with minimal  Instructions for follow-up, activity and diet:	After leaving the hospital, please follow these instructions:  • Wear your cervical [place] collar continuously, remove only for showering, cleaning incision   • Keep your wound clean and dry. Watch your incision for signs of infection such as: Redness, Swelling and tenderness, Drainage  • Take pain medicine as prescribed.  o You may find it helpful to take it for morning stiffness or for soreness when trying to sleep.  o Pain medication can cause constipation. Eat foods with fiber (fruits & vegetables), keep hydrated too--this will help prevent constipation.  • Do not smoke.  • Do not take anti-inflammatory medicines (Motrin®, Advil®, Aspirin, Ibuprofen, etc.) unless your doctor told you that you can take them.  Call you doctor immediately if you have:  • Any new numbness, tingling, or weakness in your arms and legs.  • Pain that is getting worse, or not going away after taking pain medicine.  • Any signs of infection at the wound site.  • Fever of 101° F or more.  • A hard time breathing or swallowing. Some swallowing difficulty is common after this surgery and usually goes away in a few days. Principal Discharge DX:	Cervical cord compression with myelopathy  Goal:	Return to previous level of activity with minimal  Instructions for follow-up, activity and diet:	After leaving the hospital, please follow these instructions:  • Wear your cervical [place] collar continuously, remove only for showering, cleaning incision   • Keep your wound clean and dry. Watch your incision for signs of infection such as: Redness, Swelling and tenderness, Drainage  • Take pain medicine as prescribed.  o You may find it helpful to take it for morning stiffness or for soreness when trying to sleep.  o Pain medication can cause constipation. Eat foods with fiber (fruits & vegetables), keep hydrated too--this will help prevent constipation.  • Do not smoke.  • Do not take anti-inflammatory medicines (Motrin®, Advil®, Aspirin, Ibuprofen, etc.) unless your doctor told you that you can take them.  Call you doctor immediately if you have:  • Any new numbness, tingling, or weakness in your arms and legs.  • Pain that is getting worse, or not going away after taking pain medicine.  • Any signs of infection at the wound site.  • Fever of 101° F or more.  • A hard time breathing or swallowing. Some swallowing difficulty is common after this surgery and usually goes away in a few days. Principal Discharge DX:	Cervical cord compression with myelopathy  Goal:	Return to previous level of activity with minimal  Instructions for follow-up, activity and diet:	After leaving the hospital, please follow these instructions:  • Wear your cervical [place] collar continuously, remove only for showering, cleaning incision   • Keep your wound clean and dry. Watch your incision for signs of infection such as: Redness, Swelling and tenderness, Drainage  • Take pain medicine as prescribed.  o You may find it helpful to take it for morning stiffness or for soreness when trying to sleep.  o Pain medication can cause constipation. Eat foods with fiber (fruits & vegetables), keep hydrated too--this will help prevent constipation.  • Do not smoke.  • Do not take anti-inflammatory medicines (Motrin®, Advil®, Aspirin, Ibuprofen, etc.) unless your doctor told you that you can take them.  Call you doctor immediately if you have:  • Any new numbness, tingling, or weakness in your arms and legs.  • Pain that is getting worse, or not going away after taking pain medicine.  • Any signs of infection at the wound site.  • Fever of 101° F or more.  • A hard time breathing or swallowing. Some swallowing difficulty is common after this surgery and usually goes away in a few days. Principal Discharge DX:	Cervical cord compression with myelopathy  Goal:	Return to previous level of activity with minimal impairments  Instructions for follow-up, activity and diet:	After leaving the hospital, please follow these instructions:  • Wear your cervical [place] collar continuously, remove only for showering, cleaning incision   • Keep your wound clean and dry. Watch your incision for signs of infection such as: Redness, Swelling and tenderness, Drainage  • Take pain medicine as prescribed.  o You may find it helpful to take it for morning stiffness or for soreness when trying to sleep.  o Pain medication can cause constipation. Eat foods with fiber (fruits & vegetables), keep hydrated too--this will help prevent constipation.  • Do not smoke.  • Do not take anti-inflammatory medicines (Motrin®, Advil®, Aspirin, Ibuprofen, etc.) unless your doctor told you that you can take them.  Call you doctor immediately if you have:  • Any new numbness, tingling, or weakness in your arms and legs.  • Pain that is getting worse, or not going away after taking pain medicine.  • Any signs of infection at the wound site.  • Fever of 101° F or more.  • A hard time breathing or swallowing. Some swallowing difficulty is common after this surgery and usually goes away in a few days. Principal Discharge DX:	Cervical cord compression with myelopathy  Goal:	Return to previous level of activity with minimal impairments  Instructions for follow-up, activity and diet:	After leaving the hospital, please follow these instructions:  • Wear your cervical [place] collar continuously, remove only for showering, cleaning incision   • Keep your wound clean and dry. Watch your incision for signs of infection such as: Redness, Swelling and tenderness, Drainage  • Take pain medicine as prescribed.  o You may find it helpful to take it for morning stiffness or for soreness when trying to sleep.  o Pain medication can cause constipation. Eat foods with fiber (fruits & vegetables), keep hydrated too--this will help prevent constipation.  • Do not smoke.  • Do not take anti-inflammatory medicines (Motrin®, Advil®, Aspirin, Ibuprofen, etc.) unless your doctor told you that you can take them.  Call you doctor immediately if you have:  • Any new numbness, tingling, or weakness in your arms and legs.  • Pain that is getting worse, or not going away after taking pain medicine.  • Any signs of infection at the wound site.  • Fever of 101° F or more.  • A hard time breathing or swallowing. Some swallowing difficulty is common after this surgery and usually goes away in a few days.

## 2017-06-19 NOTE — DISCHARGE NOTE ADULT - NS AS ACTIVITY OBS
Do not drive or operate machinery/Stairs allowed/No Heavy lifting/straining/Walking-Outdoors allowed/Showering allowed/Walking-Indoors allowed

## 2017-06-19 NOTE — DISCHARGE NOTE ADULT - PATIENT PORTAL LINK FT
“You can access the Follow[facility] Patient Portal, offered by [facility], by registering with the following website: [email or web address]”

## 2017-06-19 NOTE — DISCHARGE NOTE ADULT - PROCEDURE 1
Cervical corpectomy by anterior approach of additional segment following inital cervical corpectomy by anterior approach of single segment

## 2017-06-19 NOTE — DISCHARGE NOTE ADULT - HOSPITAL COURSE
62yo M w/ PMHx of HLD, h/o cervical cord compression s/p C5/C6 anterior decompression and fusion on 5/18/17 who presented to the ED on 6/13/17 c/o acute increase in weakness of his extremities. Pt stated that last week he started noticing his legs were more weak and his left foot started dragging. 6/12, pt stated his weakness worsened dramatically to the point that he was unable to walk and get up without assistance. Since his surgery, he has been taking XS tylenol and oxycodone PRN for pain control. He denies urinary or fecal incontinence, with last BM 3-4 days ago. Denies fever, chills, nausea, vomiting, SOB, headache, diarrhea.     In ED, T 97.8F, HR 78, /86, RR 18, 99% on RA. Neurology consulted (Dr. Mitchell) as this patient is well-known to Dr. Mitchell. MRI cervical spine was obtained for concern for cord compression. Pt was decadron 6mg IVPB x1. Pt was discussed during interdisciplinary rounds and decision made to undergo . Pt underwent cervical corpectomy and fusion C4-C7. Post-operative course was uncomplicated. pt was evaluated by PT/OT and AR was recommended. 62yo M w/ PMHx of HLD, h/o cervical cord compression s/p C5/C6 anterior decompression and fusion on 5/18/17 who presented to the ED on 6/13/17 c/o acute increase in weakness of his extremities. Pt stated that last week he started noticing his legs were more weak and his left foot started dragging. 6/12, pt stated his weakness worsened dramatically to the point that he was unable to walk and get up without assistance. Since his surgery, he has been taking XS tylenol and oxycodone PRN for pain control. He denies urinary or fecal incontinence, with last BM 3-4 days ago. Denies fever, chills, nausea, vomiting, SOB, headache, diarrhea. Neurology was consulted (Dr. Mitchell) as this patient is well-known to Dr. Mitchell. MRI cervical spine & thoracic spine were obtained for concern for cord compression. MRI showed ACDF at C5-6 with a moderate prevertebral fluid collection likely representing postoperative hematoma. Cervical MRI also showed severe persistent stenosis of the spinal canal and bilateral neural foramina at C5-6 with mass effect on the cord as discussed above; stable focal cord edema or possibly myelomalacia at  C5-6; and severe stenosis of the spinal canal and bilateral neural foramina at C4-5 and C6-7. Thoracic MRI showed No abnormal signal or contour of the thoracic spinal cord. Degenerative disc disease includes few disc herniations without  significant compromise of the thoracic spinal canal. Pt was started on IV steroids in the ED. Pt was discussed during interdisciplinary rounds and decision made to undergo revision anterior cervical fusion and corpectomy. Pt underwent cervical corpectomy and fusion C4-C7. Post-operative course was uncomplicated. Post-operatively, pt regained strength in upper and lower extremities bilaterally, with some residual weakness and difficulties with fine motor coordination in hands and gross motor strength and coordination--pt now walking with a rolling walker. PT/OT evaluated the patient and AR was recommended. Pt is discharged to _____ today on a slow decadron taper. 62yo M w/ PMHx of HLD, h/o cervical cord compression s/p C5/C6 anterior decompression and fusion on 5/18/17 who presented to the ED on 6/13/17 c/o acute increase in weakness of his extremities. Pt stated that last week he started noticing his legs were more weak and his left foot started dragging. 6/12, pt stated his weakness worsened dramatically to the point that he was unable to walk and get up without assistance. Since his surgery, he has been taking XS tylenol and oxycodone PRN for pain control. He denies urinary or fecal incontinence, with last BM 3-4 days ago. Denies fever, chills, nausea, vomiting, SOB, headache, diarrhea. Neurology was consulted (Dr. Mitchell) as this patient is well-known to Dr. Mitchell. MRI cervical spine & thoracic spine were obtained for concern for cord compression. MRI showed ACDF at C5-6 with a moderate prevertebral fluid collection likely representing postoperative hematoma. Cervical MRI also showed severe persistent stenosis of the spinal canal and bilateral neural foramina at C5-6 with mass effect on the cord as discussed above; stable focal cord edema or possibly myelomalacia at  C5-6; and severe stenosis of the spinal canal and bilateral neural foramina at C4-5 and C6-7. Thoracic MRI showed No abnormal signal or contour of the thoracic spinal cord. Degenerative disc disease includes few disc herniations without  significant compromise of the thoracic spinal canal. Pt was started on IV steroids in the ED. Pt was discussed during interdisciplinary rounds and decision made to undergo revision anterior cervical fusion and corpectomy. Pt underwent cervical corpectomy and fusion C4-C7. Post-operative course was uncomplicated. Post-operatively, pt regained strength in upper and lower extremities bilaterally, with some residual weakness and difficulties with fine motor coordination in hands and gross motor strength and coordination--pt now walking with a rolling walker. PT/OT evaluated the patient and AR was recommended. Pt is discharged to [facility] today on a slow decadron taper.

## 2017-06-19 NOTE — DISCHARGE NOTE ADULT - MEDICATION SUMMARY - MEDICATIONS TO TAKE
I will START or STAY ON the medications listed below when I get home from the hospital:    dexamethasone 2 mg oral tablet  -- 1 tab(s) by mouth every 8 hours for 21-23 June  1 tab(s) by mouth every 12 horus for 24-26 June  1 tab(s) by mouth every day for 26-29 June  STOP  -- Indication: For Slow steorid taper    acetaminophen 325 mg oral tablet  -- 2 tab(s) by mouth every 6 hours, As needed, Mild Pain (1 - 3)  -- Indication: For Pain (mild)    acetaminophen-oxycodone 325 mg-5 mg oral tablet  -- 1 tab(s) by mouth every 4 hours, As needed, Severe Pain (7 - 10)  -- Indication: For Pain (severe)    magnesium hydroxide 8% oral suspension  -- 30 milliliter(s) by mouth once a day, As needed, Constipation  -- Indication: For Constipation    tamsulosin 0.4 mg oral capsule  -- 1 cap(s) by mouth once a day (at bedtime)  -- Indication: For Urinary retention    enoxaparin  -- 40 milligram(s) subcutaneous once (at bedtime)  -- Indication: For Blood clot prophylaxis    insulin lispro 100 units/mL subcutaneous solution  --  subcutaneous 4 times a day (before meals and at bedtime); 2 Unit(s) if Glucose 151 - 200  4 Unit(s) if Glucose 201 - 250  6 Unit(s) if Glucose 251 - 300  8 Unit(s) if Glucose 301 - 350  10 Unit(s) if Glucose 351 - 400  12 Unit(s) if Glucose Greater Than 400  -- Indication: For Hyperglycemia    metoclopramide 5 mg/mL injectable solution  --  injectable   -- Indication: For Nausea PRN    atorvastatin 20 mg oral tablet  -- 1 tab(s) by mouth once a day  -- Indication: For Hyperlipidemia    zaleplon 5 mg oral capsule  -- 1 cap(s) by mouth once a day (at bedtime), As needed, Insomnia  -- Indication: For Insomnia    docusate sodium 100 mg oral capsule  -- 1 cap(s) by mouth 3 times a day  -- Indication: For Constipation    bisacodyl 10 mg rectal suppository  -- 1 suppository(ies) rectally once a day, As needed, Constipation  -- Indication: For Constipation    polyethylene glycol 3350 oral powder for reconstitution  -- 17 gram(s) by mouth once a day  -- Indication: For Constipation    senna oral tablet  -- 2 tab(s) by mouth once a day (at bedtime)  -- Indication: For Constipation    pantoprazole 40 mg oral delayed release tablet  -- 1 tab(s) by mouth once a day (before a meal)   -- while on steroids  -- Indication: For Steroid r/t ulcer prophylaxis

## 2017-06-19 NOTE — DISCHARGE NOTE ADULT - CARE PROVIDER_API CALL
Juan Manuel Fournier), Neurological Surgery  [address] [address]  Phone: [phone]  Fax: [phone]

## 2017-06-19 NOTE — DISCHARGE NOTE ADULT - PLAN OF CARE
Return to previous level of activity with minimal After leaving the hospital, please follow these instructions:  • Wear your cervical [place] collar continuously, remove only for showering, cleaning incision   • Keep your wound clean and dry. Watch your incision for signs of infection such as: Redness, Swelling and tenderness, Drainage  • Take pain medicine as prescribed.  o You may find it helpful to take it for morning stiffness or for soreness when trying to sleep.  o Pain medication can cause constipation. Eat foods with fiber (fruits & vegetables), keep hydrated too--this will help prevent constipation.  • Do not smoke.  • Do not take anti-inflammatory medicines (Motrin®, Advil®, Aspirin, Ibuprofen, etc.) unless your doctor told you that you can take them.  Call you doctor immediately if you have:  • Any new numbness, tingling, or weakness in your arms and legs.  • Pain that is getting worse, or not going away after taking pain medicine.  • Any signs of infection at the wound site.  • Fever of 101° F or more.  • A hard time breathing or swallowing. Some swallowing difficulty is common after this surgery and usually goes away in a few days. Return to previous level of activity with minimal impairments

## 2017-06-19 NOTE — CONSULT NOTE ADULT - SUBJECTIVE AND OBJECTIVE BOX
JG JARVIS    [id number]    Patient is a 62y old  Male who presents with a chief complaint of Weakness (14 Jun 2017 02:04)      HPI:  62yo M w/ PMHx of HLD, h/o cervical cord compression s/p C5/C6 anterior decompression and fusion on 5/18/17 presents with weakness. Pt states that last week he started noticing his legs were more weak and his left foot started dragging. Yesterday, his weakness worsened dramatically to the point that he was unable to walk and get up without assistance. Since his surgery, he has been taking XS tylenol and oxycodone PRN for pain control. He denies urinary or fecal incontinence, with last BM 3-4 days ago. Denies fever, chills, nausea, vomiting, SOB, headache, diarrhea.     In ED, T 97.8F, HR 78, /86, RR 18, 99% on RA. Neurology consulted (Dr. Mitchell) as this patient is well-known to Dr. Mitchell. MRI cervical spine concerning for cord compression. Given decadron 96mg IVPB x1. Neurosurgery consulted who agreed with steroids with possible OR to be discussed tomorrow. (14 Jun 2017 02:04)      PAST MEDICAL & SURGICAL HISTORY:  Cervical stenosis of spine  Hyperlipidemia  S/P cervical spinal fusion: 5/18/17        Social History:    FAMILY HISTORY:  No pertinent family history in first degree relatives      Functional Level Prior to Admission:                          14.2   11.5  )-----------( 292      ( 18 Jun 2017 06:21 )             42.8       06-18    137  |  98  |  21  ----------------------------<  114<H>  4.3   |  25  |  0.70    Ca    9.2      18 Jun 2017 06:21        Vital Signs Last 24 Hrs  T(C): 36.6, Max: 36.7 (06-18 @ 14:14)  T(F): 97.9, Max: 98.1 (06-18 @ 14:14)  HR: 70 (63 - 86)  BP: 140/89 (129/82 - 164/95)  BP(mean): --  RR: 18 (16 - 18)  SpO2: 95% (95% - 96%)      PHYSICAL EXAM:This 61 y-o male was admitted on 6/14/17 with inability to walk due to weakness of LE, Dragging Lt. foot. s/p cervical decompression/ cervical fusion on 5/18/17 for stenosis. Underwent C5-C6 corpectomy and cervical fusion C4-C7 for C5-6 myelopathy and stenosis on 6/15/17.      Constitutional:lying in bed, on wears [place] cervical brace. Confortable in bed and stable. Feels much better Lt. leg strength. Bed mobility, supervision. able to stand with close contack guarding and poor balance.    Eyes:neg.    ENMT:neg.    Neck:wears [place] cervical brace. no neck pain.    Breasts:    Back:no back pain    Respiratory:    Cardiovascular:    Gastrointestinal:    Genitourinary:    Rectal:    Extremities:full ROM, 4 EXT., NO JOINT PAIN, NO EDEMA.    Vascular:    Neurological No sensory deficits. Weakness of Lt hand extension 3-3+/5. Lt.lower ext; hip, knee and ankle, 4-/5 and poor coordination. Unsteady. On PT, ambulation with rolling walker with minimal assistance.    Skin:    Lymph Nodes:    Musculoskeletal:    Psychiatric:            Impression:1. Cervical myelopathy and stenosis, s/p C5-6 corpectomy, C5-6 and C4-7 cervical fusion for C5-6 myelopathy/ stenosis with weakness of Lt. leg and Lt. hand.     Recommendations: 1. Continue PT. 2. Acute Rehab. placement. 3. No lower ext. splint is indicated at this time.

## 2017-06-19 NOTE — PROGRESS NOTE ADULT - SUBJECTIVE AND OBJECTIVE BOX
HPI:  60yo M w/ PMHx of HLD, h/o cervical cord compression s/p C5/C6 anterior decompression and fusion on 5/18/17 presents with weakness. Pt states that last week he started noticing his legs were more weak and his left foot started dragging. Yesterday, his weakness worsened dramatically to the point that he was unable to walk and get up without assistance. Since his surgery, he has been taking XS tylenol and oxycodone PRN for pain control. He denies urinary or fecal incontinence, with last BM 3-4 days ago. Denies fever, chills, nausea, vomiting, SOB, headache, diarrhea.     In ED, T 97.8F, HR 78, /86, RR 18, 99% on RA. Neurology consulted (Dr. Mitchell) as this patient is well-known to Dr. Mitchell. MRI cervical spine concerning for cord compression. Given decadron 96mg IVPB x1. Neurosurgery consulted who agreed with steroids with possible OR to be discussed tomorrow. (14 Jun 2017 02:04)    OVERNIGHT EVENTS:  Vital Signs Last 24 Hrs  T(C): 36.6, Max: 36.7 (06-18 @ 14:14)  +ve downey  LLE 3/5 proximal, 2/5 distal RLE 5/5 +ve downey  LLE 3/5 proximal, 2/5 distal RLE 5/5 T(F): 97.9, Max: 98.1 (06-18 @ 14:14)  HR: 70 (63 - 86)  BP: 140/89 (129/82 - 164/95)  BP(mean): --  RR: 18 (16 - 18)  SpO2: 95% (95% - 96%)    I&O's Summary    I & Os for current day (as of 19 Jun 2017 09:21)  =============================================  IN: 240 ml / OUT: 1900 ml / NET: -1660 ml      PHYSICAL EXAM:  Neurological: A&OX3 Cranial nerves intact  Motor: BUE 5/5  LLE 3/5 proximal, 2/5 distal RLE 5/5   ACDF incision CDI     Cardiovascular: RRR  Respiratory:Lungs CTAB  Gastrointestinal: +BS  Genitourinary: Voiding without difficulty  Extremities: warm and dry    DIET: Regular      LABS:                        14.2   11.5  )-----------( 292      ( 18 Jun 2017 06:21 )             42.8     06-18    137  |  98  |  21  ----------------------------<  114<H>  4.3   |  25  |  0.70    Ca    9.2      18 Jun 2017 06:21              CAPILLARY BLOOD GLUCOSE  116 (19 Jun 2017 06:54)  162 (18 Jun 2017 21:31)  186 (18 Jun 2017 11:20)      Drug Levels: [] N/A    CSF Analysis: [] N/A      Allergies    No Known Allergies    Intolerances      MEDICATIONS:  Antibiotics:    Neuro:  acetaminophen   Tablet. 650milliGRAM(s) Oral every 6 hours PRN  oxyCODONE  5 mG/acetaminophen 325 mG 1Tablet(s) Oral every 4 hours PRN  oxyCODONE  5 mG/acetaminophen 325 mG 2Tablet(s) Oral every 6 hours PRN  diazepam    Tablet 5milliGRAM(s) Oral every 12 hours PRN  metoclopramide Injectable 10milliGRAM(s) IV Push once PRN  zaleplon 5milliGRAM(s) Oral at bedtime PRN    Anticoagulation:  enoxaparin Injectable 40milliGRAM(s) SubCutaneous at bedtime    OTHER:  dextrose Gel 1Dose(s) Oral once PRN  glucagon  Injectable 1milliGRAM(s) IntraMuscular once PRN  polyethylene glycol 3350 17Gram(s) Oral daily  atorvastatin 20milliGRAM(s) Oral at bedtime  docusate sodium 100milliGRAM(s) Oral three times a day  senna 2Tablet(s) Oral at bedtime  insulin lispro (HumaLOG) corrective regimen sliding scale  SubCutaneous Before meals and at bedtime  dextrose Gel 1Dose(s) Oral once PRN  dextrose 50% Injectable 12.5Gram(s) IV Push once  dextrose 50% Injectable 25Gram(s) IV Push once  glucagon  Injectable 1milliGRAM(s) IntraMuscular once PRN  pantoprazole    Tablet 40milliGRAM(s) Oral before breakfast  tamsulosin 0.4milliGRAM(s) Oral at bedtime  magnesium hydroxide Suspension 30milliLiter(s) Oral daily PRN  dexamethasone     Tablet 4milliGRAM(s) Oral every 8 hours  bisacodyl Suppository 10milliGRAM(s) Rectal daily PRN    IVF:  dextrose 5%. 1000milliLiter(s) IV Continuous <Continuous>        ASSESSMENT:  62y Male s/p ACDF corpectomy and fusion    PLAN:    NEURO:  Monitor neuro status  OT/PT  Dr Batista to evaluate BLE shin splint  Continue Hard Cervical collar  Pain management  Bowel regime    Dispo: Discussed with attending HPI:  62yo M w/ PMHx of HLD, h/o cervical cord compression s/p C5/C6 anterior decompression and fusion on 5/18/17 presents with weakness. Pt states that last week he started noticing his legs were more weak and his left foot started dragging. Yesterday, his weakness worsened dramatically to the point that he was unable to walk and get up without assistance. Since his surgery, he has been taking XS tylenol and oxycodone PRN for pain control. He denies urinary or fecal incontinence, with last BM 3-4 days ago. Denies fever, chills, nausea, vomiting, SOB, headache, diarrhea.     In ED, T 97.8F, HR 78, /86, RR 18, 99% on RA. Neurology consulted (Dr. Mitchell) as this patient is well-known to Dr. Mitchell. MRI cervical spine concerning for cord compression. Given decadron 96mg IVPB x1. Neurosurgery consulted who agreed with steroids with possible OR to be discussed tomorrow. (14 Jun 2017 02:04)    OVERNIGHT EVENTS:  Vital Signs Last 24 Hrs  T(C): 36.6, Max: 36.7 (06-18 @ 14:14)  +ve downey  LLE 3/5 proximal, 2/5 distal RLE 5/5 +ve downey  LLE 3/5 proximal, 2/5 distal RLE 5/5 T(F): 97.9, Max: 98.1 (06-18 @ 14:14)  HR: 70 (63 - 86)  BP: 140/89 (129/82 - 164/95)  BP(mean): --  RR: 18 (16 - 18)  SpO2: 95% (95% - 96%)    I&O's Summary    I & Os for current day (as of 19 Jun 2017 09:21)  =============================================  IN: 240 ml / OUT: 1900 ml / NET: -1660 ml      PHYSICAL EXAM:  Neurological: A&OX3 Cranial nerves intact  Motor: BUE 5/5  LLE 3/5 proximal, 2/5 distal RLE 5/5   ACDF incision CDI     Cardiovascular: RRR  Respiratory:Lungs CTAB  Gastrointestinal: +BS  Genitourinary: Voiding without difficulty  Extremities: warm and dry    DIET: Regular      LABS:                        14.2   11.5  )-----------( 292      ( 18 Jun 2017 06:21 )             42.8     06-18    137  |  98  |  21  ----------------------------<  114<H>  4.3   |  25  |  0.70    Ca    9.2      18 Jun 2017 06:21              CAPILLARY BLOOD GLUCOSE  116 (19 Jun 2017 06:54)  162 (18 Jun 2017 21:31)  186 (18 Jun 2017 11:20)      Drug Levels: [] N/A    CSF Analysis: [] N/A      Allergies    No Known Allergies    Intolerances      MEDICATIONS:  Antibiotics:    Neuro:  acetaminophen   Tablet. 650milliGRAM(s) Oral every 6 hours PRN  oxyCODONE  5 mG/acetaminophen 325 mG 1Tablet(s) Oral every 4 hours PRN  oxyCODONE  5 mG/acetaminophen 325 mG 2Tablet(s) Oral every 6 hours PRN  diazepam    Tablet 5milliGRAM(s) Oral every 12 hours PRN  metoclopramide Injectable 10milliGRAM(s) IV Push once PRN  zaleplon 5milliGRAM(s) Oral at bedtime PRN    Anticoagulation:  enoxaparin Injectable 40milliGRAM(s) SubCutaneous at bedtime    OTHER:  dextrose Gel 1Dose(s) Oral once PRN  glucagon  Injectable 1milliGRAM(s) IntraMuscular once PRN  polyethylene glycol 3350 17Gram(s) Oral daily  atorvastatin 20milliGRAM(s) Oral at bedtime  docusate sodium 100milliGRAM(s) Oral three times a day  senna 2Tablet(s) Oral at bedtime  insulin lispro (HumaLOG) corrective regimen sliding scale  SubCutaneous Before meals and at bedtime  dextrose Gel 1Dose(s) Oral once PRN  dextrose 50% Injectable 12.5Gram(s) IV Push once  dextrose 50% Injectable 25Gram(s) IV Push once  glucagon  Injectable 1milliGRAM(s) IntraMuscular once PRN  pantoprazole    Tablet 40milliGRAM(s) Oral before breakfast  tamsulosin 0.4milliGRAM(s) Oral at bedtime  magnesium hydroxide Suspension 30milliLiter(s) Oral daily PRN  dexamethasone     Tablet 4milliGRAM(s) Oral every 8 hours  bisacodyl Suppository 10milliGRAM(s) Rectal daily PRN    IVF:  dextrose 5%. 1000milliLiter(s) IV Continuous <Continuous>        ASSESSMENT:  62y Male s/p ACDF corpectomy and fusion    PLAN:    NEURO:  Monitor neuro status  OT/PT  Dr Batista to evaluate BLE shin splint  Continue Hard Cervical collar  Pain management  Bowel regime  Pt able to tolerated 3 hours of Acute rehab without difficulty prior to being discharge to home with family    Dispo: Discussed with attending

## 2017-06-19 NOTE — PROGRESS NOTE ADULT - SUBJECTIVE AND OBJECTIVE BOX
CC: weakness; cervical myelopathy    Interval history: He reports significant improvement in strength, and has been walking with a walker. He feels too unsteady without a walker and has not walked without it yet. Han removed. Reports minimal pain at incision site. CT cervical spine showed post-surgical changes without subluxation of hardware.     ROS: + constipation; no urinary retention     Exam:  Gen: sitting up in chair, no distress, in cervical collar  MS: awake, alert, speech fluent, comprehension intact; affect constricted  Motor: Deltoid, biceps 5/5; triceps 4+/5 b/l; wrist extension 5, finger extension 5 R 4+ L, finger abduction 4+ R 4 L, finger flexion 5 b/l; hip flexion 5 R 4 L, knee flexion 5 R 4 L, ankle dorsiflexion 5 R 3 L, EHL 4 R  4- L, foot eversion 4+ b/l, inversion 5 R 4- L; no atrophy, no involuntary movements; increased tone with spastic catch in left LE; finger tapping slower and lower amplitude on left compared to right  Sensory: vibration moderately reduced R big toe, absent L big toed; moderately reduced L ankle and knee; JPS impaired at toes b/l, intact at ankles  Reflexes: 3+ throughout, slightly increased on the L, + suprapatellar and  cross-adduction b/l, + [place] b/l L > R, plantar response extensor b/l, 2-3 beats clonus left ankle, absent on right  Coordination: no dysmetria on finger to nose  Gait: with walker, deliberate steps, narrow base, mildly spastic left leg with diminished knee flexion, hip flexion, and ankle dorsiflexion

## 2017-06-19 NOTE — PROGRESS NOTE ADULT - SUBJECTIVE AND OBJECTIVE BOX
NEUROSURGERY PAIN MANAGEMENT PLAN NOTE    **Plan**  - No changes to current regimen, minimal pain control issues.     HPI:  62yo M w/ PMHx of HLD, h/o cervical cord compression s/p C5/C6 anterior decompression and fusion on 5/18/17 presents with weakness. Pt states that last week he started noticing his legs were more weak and his left foot started dragging. Yesterday, his weakness worsened dramatically to the point that he was unable to walk and get up without assistance. Since his surgery, he has been taking XS tylenol and oxycodone PRN for pain control. He denies urinary or fecal incontinence, with last BM 3-4 days ago. Denies fever, chills, nausea, vomiting, SOB, headache, diarrhea.     In ED, T 97.8F, HR 78, /86, RR 18, 99% on RA. Neurology consulted (Dr. Mitchell) as this patient is well-known to Dr. Mitchell. MRI cervical spine concerning for cord compression. Given decadron 96mg IVPB x1. Neurosurgery consulted who agreed with steroids with possible OR to be discussed tomorrow. (14 Jun 2017 02:04)      PAST MEDICAL & SURGICAL HISTORY:  Cervical stenosis of spine  Hyperlipidemia  S/P cervical spinal fusion: 5/18/17      FAMILY HISTORY:  No pertinent family history in first degree relatives      SOCIAL HISTORY:  [ ] Denies Smoking, Alcohol, or Drug Use    HOME MEDICATIONS:   Please refer to initial HNP    PAIN HOME MEDICATIONS:    Allergies    No Known Allergies    Intolerances    PAIN MEDICATIONS:  acetaminophen   Tablet. 650milliGRAM(s) Oral every 6 hours PRN  oxyCODONE  5 mG/acetaminophen 325 mG 1Tablet(s) Oral every 4 hours PRN  oxyCODONE  5 mG/acetaminophen 325 mG 2Tablet(s) Oral every 6 hours PRN  diazepam    Tablet 5milliGRAM(s) Oral every 12 hours PRN  metoclopramide Injectable 10milliGRAM(s) IV Push once PRN  zaleplon 5milliGRAM(s) Oral at bedtime PRN    Heme:  enoxaparin Injectable 40milliGRAM(s) SubCutaneous at bedtime    Antibiotics:    Cardiovascular:  tamsulosin 0.4milliGRAM(s) Oral at bedtime    GI:  polyethylene glycol 3350 17Gram(s) Oral daily  docusate sodium 100milliGRAM(s) Oral three times a day  senna 2Tablet(s) Oral at bedtime  pantoprazole    Tablet 40milliGRAM(s) Oral before breakfast  magnesium hydroxide Suspension 30milliLiter(s) Oral daily PRN  bisacodyl Suppository 10milliGRAM(s) Rectal daily PRN    Endocrine:  dextrose Gel 1Dose(s) Oral once PRN  glucagon  Injectable 1milliGRAM(s) IntraMuscular once PRN  atorvastatin 20milliGRAM(s) Oral at bedtime  insulin lispro (HumaLOG) corrective regimen sliding scale  SubCutaneous Before meals and at bedtime  dextrose Gel 1Dose(s) Oral once PRN  dextrose 50% Injectable 12.5Gram(s) IV Push once  dextrose 50% Injectable 25Gram(s) IV Push once  glucagon  Injectable 1milliGRAM(s) IntraMuscular once PRN  dexamethasone     Tablet 4milliGRAM(s) Oral every 8 hours    All Other Medications:  dextrose 5%. 1000milliLiter(s) IV Continuous <Continuous>      Vital Signs Last 24 Hrs  T(C): 36.6, Max: 36.7 (06-18 @ 14:14)  T(F): 97.9, Max: 98.1 (06-18 @ 14:14)  HR: 70 (63 - 86)  BP: 140/89 (129/82 - 164/95)  BP(mean): --  RR: 18 (16 - 18)  SpO2: 95% (95% - 96%)    LABS:                        14.2   11.5  )-----------( 292      ( 18 Jun 2017 06:21 )             42.8     06-18    137  |  98  |  21  ----------------------------<  114<H>  4.3   |  25  |  0.70    Ca    9.2      18 Jun 2017 06:21    REVIEW OF SYSTEMS:  CONSTITUTIONAL: No fever, weight loss, or fatigue  EYES: No eye pain, visual disturbances, or discharge  ENMT:  No difficulty hearing, tinnitus, vertigo; No sinus or throat pain  NECK: No pain or stiffness  RESPIRATORY: No cough, wheezing, chills or hemoptysis; No shortness of breath.  CARDIOVASCULAR: No chest pain, palpitations  GASTROINTESTINAL: No BM since pre-op, sensation of constipation. No abdominal or epigastric pain. No nausea, vomiting; No diarrhea or constipation.  GENITOURINARY: No dysuria, frequency, hematuria, or incontinence  NEUROLOGICAL: No headaches, memory loss, loss of strength, numbness, or tremors  MUSCULOSKELETAL: Minimal incisional pain. joint pain or swelling; No muscle, back, or extremity pain    FUNCTIONAL ASSESSMENT:  PAIN SCORE AT REST:   Denies      SCALE USED: (1-10 VNRS)  PAIN SCORE WITH ACTIVITY:   Denies      SCALE USED: (1-10 VNRS)    PHYSICAL EXAM  GENERAL: NAD, well-groomed, well-developed  HEAD:  Atraumatic, Normocephalic  EYES: EOMI, PERRLA, conjunctiva and sclera clear  NECK: [place] collar  NERVOUS SYSTEM:    Alert & Oriented X3, Good concentration;   Cranial nerves grossly intact  Motor Strength 5/5 B/L in lower extremities; 5/5 in B/L triceps/biceps, full ROM in UE--full abduction/adduction. Hand  4+/5- in LUE, 5-/5+. Limited fine motor coordination in LUE, 4th, 5th digits--specifically.  Sensation intact to LT in UE/LE in 3 dermatomes  Cervical: No facet tenderness. Negative Queen's sign. Cervical ROM not assessed, s/p surgery and restricted turning.  CHEST/LUNG: Clear to auscultation bilaterally; No rales, rhonchi, wheezing, or rubs  HEART: Regular rate and rhythm; No murmurs, rubs, or gallops  ABDOMEN: Soft, Nontender, Nondistended; Bowel sounds present  SKIN: No rashes or lesions        ASSESSMENT:   62y Male s/p ACDF corpectomy and fusion    PLAN:   1. Opioids  Since yesterday 6am, pt has required no opioids.     PLAN: C/w Percocet. No LA not required. Pt receiving adequate coverage. For breakthrough pain, IV Tylenol, no IVP dilaudid required.     2. Neuropathics  Pt currently denies neuropathic pain. No numbness/tingling/electric shock like sensation in LE/UE b.l.    PLAN: No indication for neuropathic agents.     3. Adjuvants  Pt not complaining of muscle spasms/cramping in neck/back. Ambien q bedtime requested for disturbed sleeping today. Tylenol 650mg q6h PRN for Mild Pain. Pt on Percocet.     PLAN: No need to dc with Valium. C/w [place] q bedtime.     4. Prophylactic:   Bowel regimen: As above  Nausea PRN: Zofran PRN for Nausea, pt does not c/o current    5. Functional Goals:   Pt will get OOB with PT today. Pt will resume previous level of activity without impairment from surgery.     6. Additional Consults:   None recommended.     7. Additional Labs/Imaging:   None recommended.     8. Follow up, Discharge Planning:   Patient is set for discharge to: [facility]  Discharge is pending: BM, acceptance to facility  Pain Management follow up plan: F/inpatient, no need for outpatient f/u

## 2017-06-19 NOTE — DISCHARGE NOTE ADULT - INSTRUCTIONS
Activity: As tolerated. Stairs as tolerated. No strenuous activity, bending, heavy lifting >5lbs, running, swimming, tub baths, exercise classes until discussion at follow up appointment.  Diet: Regular diet

## 2017-06-20 LAB — SURGICAL PATHOLOGY STUDY: SIGNIFICANT CHANGE UP

## 2017-06-20 PROCEDURE — 99231 SBSQ HOSP IP/OBS SF/LOW 25: CPT

## 2017-06-20 PROCEDURE — 93970 EXTREMITY STUDY: CPT | Mod: 26

## 2017-06-20 RX ORDER — DEXAMETHASONE 0.5 MG/5ML
2 ELIXIR ORAL EVERY 8 HOURS
Qty: 0 | Refills: 0 | Status: DISCONTINUED | OUTPATIENT
Start: 2017-06-20 | End: 2017-06-21

## 2017-06-20 RX ORDER — TRAMADOL HYDROCHLORIDE 50 MG/1
50 TABLET ORAL EVERY 4 HOURS
Qty: 0 | Refills: 0 | Status: DISCONTINUED | OUTPATIENT
Start: 2017-06-20 | End: 2017-06-21

## 2017-06-20 RX ADMIN — ENOXAPARIN SODIUM 40 MILLIGRAM(S): 100 INJECTION SUBCUTANEOUS at 22:16

## 2017-06-20 RX ADMIN — Medication 2 MILLIGRAM(S): at 14:12

## 2017-06-20 RX ADMIN — PANTOPRAZOLE SODIUM 40 MILLIGRAM(S): 20 TABLET, DELAYED RELEASE ORAL at 06:08

## 2017-06-20 RX ADMIN — Medication 4 MILLIGRAM(S): at 06:08

## 2017-06-20 RX ADMIN — Medication 100 MILLIGRAM(S): at 06:08

## 2017-06-20 RX ADMIN — ATORVASTATIN CALCIUM 20 MILLIGRAM(S): 80 TABLET, FILM COATED ORAL at 22:17

## 2017-06-20 RX ADMIN — Medication 100 MILLIGRAM(S): at 22:16

## 2017-06-20 RX ADMIN — Medication 2 MILLIGRAM(S): at 22:15

## 2017-06-20 RX ADMIN — POLYETHYLENE GLYCOL 3350 17 GRAM(S): 17 POWDER, FOR SOLUTION ORAL at 14:13

## 2017-06-20 RX ADMIN — SENNA PLUS 2 TABLET(S): 8.6 TABLET ORAL at 22:16

## 2017-06-20 RX ADMIN — Medication 100 MILLIGRAM(S): at 14:12

## 2017-06-20 NOTE — PROGRESS NOTE ADULT - SUBJECTIVE AND OBJECTIVE BOX
CC: weakness    Interval history: Feels that left leg is stronger, and gait is more stable. He still gets tired and starts feeling weaker in the afternoon. Awaiting rehab.     ROS: no urinary incontinence; minimal anterior neck pain    Exam:  Gen: sitting up in bed, in cervical collar  MS: awake, alert, speech fluent, comprehension intact  Motor: left hip flexion 4+, knee flexion 4+, ankle dorsiflexion 4+, mildly increased tone LLE  Reflexes: 2-3 beats clonus at left ankle

## 2017-06-20 NOTE — PROGRESS NOTE ADULT - SUBJECTIVE AND OBJECTIVE BOX
60yo M w/ PMHx of HLD, h/o cervical cord compression s/p C5/C6 anterior decompression and fusion on 5/18/17 presents with weakness. Pt states that last week he started noticing his legs were more weak and his left foot started dragging. Yesterday, his weakness worsened dramatically to the point that he was unable to walk and get up without assistance. Since his surgery, he has been taking XS tylenol and oxycodone PRN for pain control. He denies urinary or fecal incontinence, with last BM 3-4 days ago. Denies fever, chills, nausea, vomiting, SOB, headache, diarrhea.     Subjective: Patient reports doing well with gradual improvement of strength and ambulation. Has been working with PT and using a walker. No complaints of pain.    T(C): 36.6, Max: 37 (06-19 @ 16:48)  HR: 63 (63 - 90)  BP: 152/97 (125/87 - 153/93)  RR: 16 (16 - 16)  SpO2: 95% (95% - 96%)  Wt(kg): --    Exam: NAD, AAOx3  PERRL. EOMI.  +Hard collar, cervical incision C/D/I on left  Lungs clear b/l  Heart S1, S2. NSR.  Abd soft, NT/ND. +BS  Pulses 2+ throughout  CNs II-XII intact. 5/5 str in UEs b/l. Left foot dorsiflexion 4/5, o/w 5/5 throughout. +Hyperreflexia in LEs, few beats of clonus. Sensation to LT intact throughout. Broad based gait w/ walker.      MEDICATIONS  (STANDING):  dextrose 5%. 1000milliLiter(s) IV Continuous <Continuous>  polyethylene glycol 3350 17Gram(s) Oral daily  atorvastatin 20milliGRAM(s) Oral at bedtime  docusate sodium 100milliGRAM(s) Oral three times a day  senna 2Tablet(s) Oral at bedtime  insulin lispro (HumaLOG) corrective regimen sliding scale  SubCutaneous Before meals and at bedtime  dextrose 50% Injectable 12.5Gram(s) IV Push once  dextrose 50% Injectable 25Gram(s) IV Push once  pantoprazole    Tablet 40milliGRAM(s) Oral before breakfast  tamsulosin 0.4milliGRAM(s) Oral at bedtime  enoxaparin Injectable 40milliGRAM(s) SubCutaneous at bedtime  dexamethasone     Tablet 2milliGRAM(s) Oral every 8 hours    MEDICATIONS  (PRN):  dextrose Gel 1Dose(s) Oral once PRN Blood Glucose LESS THAN 70 milliGRAM(s)/deciliter  glucagon  Injectable 1milliGRAM(s) IntraMuscular once PRN Glucose LESS THAN 70 milligrams/deciliter  acetaminophen   Tablet. 650milliGRAM(s) Oral every 6 hours PRN Mild Pain (1 - 3)  oxyCODONE  5 mG/acetaminophen 325 mG 1Tablet(s) Oral every 4 hours PRN Moderate Pain  oxyCODONE  5 mG/acetaminophen 325 mG 2Tablet(s) Oral every 6 hours PRN Severe Pain  diazepam    Tablet 5milliGRAM(s) Oral every 12 hours PRN Anxiety  metoclopramide Injectable 10milliGRAM(s) IV Push once PRN Nausea and/or Vomiting  dextrose Gel 1Dose(s) Oral once PRN Blood Glucose LESS THAN 70 milliGRAM(s)/deciliter  glucagon  Injectable 1milliGRAM(s) IntraMuscular once PRN Glucose LESS THAN 70 milligrams/deciliter  magnesium hydroxide Suspension 30milliLiter(s) Oral daily PRN Constipation  bisacodyl Suppository 10milliGRAM(s) Rectal daily PRN Constipation  zaleplon 5milliGRAM(s) Oral at bedtime PRN Insomnia          Assessment/Plan: 62 year old male with cervical cord compression s/p Reexploration of previous C5-C6 corpectomy with C4-C7 fusion anteriorly POD#5.    -Awaiting acute rehab, will f/u w/ Social Work  -Slow decadron taper,  -Pain meds as ordered,  -Chemoppx, LE dopplers ordered,  -OOB/PO as tolerated,  -D/w Dr. Fournier 62yo M w/ PMHx of HLD, h/o cervical cord compression s/p C5/C6 anterior decompression and fusion on 5/18/17 presents with weakness. Pt states that last week he started noticing his legs were more weak and his left foot started dragging. Yesterday, his weakness worsened dramatically to the point that he was unable to walk and get up without assistance. Since his surgery, he has been taking XS tylenol and oxycodone PRN for pain control. He denies urinary or fecal incontinence, with last BM 3-4 days ago. Denies fever, chills, nausea, vomiting, SOB, headache, diarrhea.     Subjective: Patient reports doing well with gradual improvement of strength and ambulation. Has been working with PT and using a walker. No complaints of pain.    T(C): 36.6, Max: 37 (06-19 @ 16:48)  HR: 63 (63 - 90)  BP: 152/97 (125/87 - 153/93)  RR: 16 (16 - 16)  SpO2: 95% (95% - 96%)  Wt(kg): --    Exam: NAD, AAOx3  PERRL. EOMI.  +Hard collar, cervical incision C/D/I on left  Lungs clear b/l  Heart S1, S2. NSR.  Abd soft, NT/ND. +BS  Pulses 2+ throughout  CNs II-XII intact. 5/5 str in UEs b/l. Left foot dorsiflexion 4/5, o/w 5/5 throughout. +Hyperreflexia in LEs, few beats of clonus. Sensation to LT intact throughout. Broad based gait w/ walker.      MEDICATIONS  (STANDING):  dextrose 5%. 1000milliLiter(s) IV Continuous <Continuous>  polyethylene glycol 3350 17Gram(s) Oral daily  atorvastatin 20milliGRAM(s) Oral at bedtime  docusate sodium 100milliGRAM(s) Oral three times a day  senna 2Tablet(s) Oral at bedtime  insulin lispro (HumaLOG) corrective regimen sliding scale  SubCutaneous Before meals and at bedtime  dextrose 50% Injectable 12.5Gram(s) IV Push once  dextrose 50% Injectable 25Gram(s) IV Push once  pantoprazole    Tablet 40milliGRAM(s) Oral before breakfast  tamsulosin 0.4milliGRAM(s) Oral at bedtime  enoxaparin Injectable 40milliGRAM(s) SubCutaneous at bedtime  dexamethasone     Tablet 2milliGRAM(s) Oral every 8 hours    MEDICATIONS  (PRN):  dextrose Gel 1Dose(s) Oral once PRN Blood Glucose LESS THAN 70 milliGRAM(s)/deciliter  glucagon  Injectable 1milliGRAM(s) IntraMuscular once PRN Glucose LESS THAN 70 milligrams/deciliter  acetaminophen   Tablet. 650milliGRAM(s) Oral every 6 hours PRN Mild Pain (1 - 3)  oxyCODONE  5 mG/acetaminophen 325 mG 1Tablet(s) Oral every 4 hours PRN Moderate Pain  oxyCODONE  5 mG/acetaminophen 325 mG 2Tablet(s) Oral every 6 hours PRN Severe Pain  diazepam    Tablet 5milliGRAM(s) Oral every 12 hours PRN Anxiety  metoclopramide Injectable 10milliGRAM(s) IV Push once PRN Nausea and/or Vomiting  dextrose Gel 1Dose(s) Oral once PRN Blood Glucose LESS THAN 70 milliGRAM(s)/deciliter  glucagon  Injectable 1milliGRAM(s) IntraMuscular once PRN Glucose LESS THAN 70 milligrams/deciliter  magnesium hydroxide Suspension 30milliLiter(s) Oral daily PRN Constipation  bisacodyl Suppository 10milliGRAM(s) Rectal daily PRN Constipation  zaleplon 5milliGRAM(s) Oral at bedtime PRN Insomnia          Assessment/Plan: 62 year old male with cervical cord compression s/p Reexploration of previous C5-C6 corpectomy with C4-C7 fusion anteriorly POD#5.      -Slow decadron taper over 1 week.  -[place] collar at all times, except when showering  -Pain meds as ordered,  -Chemoppx, LE dopplers ordered,  -OOB/PO as tolerated,  -Patient for acute rehab placement, will require an ambulance for transportation s/p ACDF and cannot sit for prolonged period of times. Will tolerate 3 hour of acute PT in rehab without difficulty.  -D/w Dr. Fournier

## 2017-06-20 NOTE — PROGRESS NOTE ADULT - SUBJECTIVE AND OBJECTIVE BOX
NEUROSURGERY PAIN MANAGEMENT PROGRESS NOTE    INCOMPLETE NOTE    PLAN:   - Decrease Percocet dosing, no use over past 24 hours, pain well controlled without opioids  - Aggressive bowel regimen    HPI:  62yo M w/ PMHx of HLD, h/o cervical cord compression s/p C5/C6 anterior decompression and fusion on 5/18/17 presents with weakness. Pt states that last week he started noticing his legs were more weak and his left foot started dragging. Yesterday, his weakness worsened dramatically to the point that he was unable to walk and get up without assistance. Since his surgery, he has been taking XS tylenol and oxycodone PRN for pain control. He denies urinary or fecal incontinence, with last BM 3-4 days ago. Denies fever, chills, nausea, vomiting, SOB, headache, diarrhea.     In ED, T 97.8F, HR 78, /86, RR 18, 99% on RA. Neurology consulted (Dr. Mitchell) as this patient is well-known to Dr. Mitchell. MRI cervical spine concerning for cord compression. Given decadron 96mg IVPB x1. Neurosurgery consulted who agreed with steroids with possible OR to be discussed tomorrow. (14 Jun 2017 02:04)      PAST MEDICAL & SURGICAL HISTORY:  Cervical stenosis of spine  Hyperlipidemia  S/P cervical spinal fusion: 5/18/17      FAMILY HISTORY:  No pertinent family history in first degree relatives      SOCIAL HISTORY:  [ ] Denies Smoking, Alcohol, or Drug Use    HOME MEDICATIONS:   Please refer to initial HNP    PAIN HOME MEDICATIONS:    Allergies    No Known Allergies    Intolerances        PAIN MEDICATIONS:  acetaminophen   Tablet. 650milliGRAM(s) Oral every 6 hours PRN  oxyCODONE  5 mG/acetaminophen 325 mG 1Tablet(s) Oral every 4 hours PRN  oxyCODONE  5 mG/acetaminophen 325 mG 2Tablet(s) Oral every 6 hours PRN  diazepam    Tablet 5milliGRAM(s) Oral every 12 hours PRN  metoclopramide Injectable 10milliGRAM(s) IV Push once PRN  zaleplon 5milliGRAM(s) Oral at bedtime PRN    Heme:  enoxaparin Injectable 40milliGRAM(s) SubCutaneous at bedtime    Antibiotics:    Cardiovascular:  tamsulosin 0.4milliGRAM(s) Oral at bedtime    GI:  polyethylene glycol 3350 17Gram(s) Oral daily  docusate sodium 100milliGRAM(s) Oral three times a day  senna 2Tablet(s) Oral at bedtime  pantoprazole    Tablet 40milliGRAM(s) Oral before breakfast  magnesium hydroxide Suspension 30milliLiter(s) Oral daily PRN  bisacodyl Suppository 10milliGRAM(s) Rectal daily PRN    Endocrine:  dextrose Gel 1Dose(s) Oral once PRN  glucagon  Injectable 1milliGRAM(s) IntraMuscular once PRN  atorvastatin 20milliGRAM(s) Oral at bedtime  insulin lispro (HumaLOG) corrective regimen sliding scale  SubCutaneous Before meals and at bedtime  dextrose Gel 1Dose(s) Oral once PRN  dextrose 50% Injectable 12.5Gram(s) IV Push once  dextrose 50% Injectable 25Gram(s) IV Push once  glucagon  Injectable 1milliGRAM(s) IntraMuscular once PRN  dexamethasone     Tablet 2milliGRAM(s) Oral every 8 hours    All Other Medications:  dextrose 5%. 1000milliLiter(s) IV Continuous <Continuous>      Vital Signs Last 24 Hrs  T(C): 36.7, Max: 37 (06-19 @ 16:48)  T(F): 98.1, Max: 98.6 (06-19 @ 16:48)  HR: 67 (63 - 90)  BP: 152/92 (125/87 - 153/93)  BP(mean): --  RR: 16 (16 - 16)  SpO2: 96% (95% - 96%)    REVIEW OF SYSTEMS:  CONSTITUTIONAL: No fever, weight loss, or fatigue  EYES: No eye pain, visual disturbances, or discharge  ENMT:  No difficulty hearing, tinnitus, vertigo; No sinus or throat pain  NECK: No pain or stiffness  RESPIRATORY: No cough, wheezing, chills or hemoptysis; No shortness of breath.  CARDIOVASCULAR: No chest pain, palpitations  GASTROINTESTINAL: No BM since pre-op, sensation of constipation. No abdominal or epigastric pain. No nausea, vomiting; No diarrhea or constipation.  GENITOURINARY: No dysuria, frequency, hematuria, or incontinence  NEUROLOGICAL: No headaches, memory loss, loss of strength, numbness, or tremors  MUSCULOSKELETAL: Minimal incisional pain. joint pain or swelling; No muscle, back, or extremity pain    FUNCTIONAL ASSESSMENT:  PAIN SCORE AT REST:   Denies      SCALE USED: (1-10 VNRS)  PAIN SCORE WITH ACTIVITY:   Denies      SCALE USED: (1-10 VNRS)    PHYSICAL EXAM  GENERAL: NAD, well-groomed, well-developed  HEAD:  Atraumatic, Normocephalic  EYES: EOMI, PERRLA, conjunctiva and sclera clear  NECK: [place] collar  NERVOUS SYSTEM:    Alert & Oriented X3, Good concentration;   Cranial nerves grossly intact  Motor Strength 5/5 B/L in lower extremities; 5/5 in B/L triceps/biceps, full ROM in UE--full abduction/adduction. Hand  4+/5- in LUE, 5-/5+. Limited fine motor coordination in LUE, 4th, 5th digits--specifically.  Sensation intact to LT in UE/LE in 3 dermatomes  Cervical: No facet tenderness. Negative Queen's sign. Cervical ROM not assessed, s/p surgery and restricted turning.  CHEST/LUNG: Clear to auscultation bilaterally; No rales, rhonchi, wheezing, or rubs  HEART: Regular rate and rhythm; No murmurs, rubs, or gallops  ABDOMEN: Soft, Nontender, Nondistended; Bowel sounds present  SKIN: No rashes or lesions    ASSESSMENT:   62y Male s/p ACDF corpectomy and fusion    PLAN:   1. Opioids  Since yesterday 6am, pt has required no opioids.     PLAN: Will decrease dosing of  Percocet 5mg q4h PRN for Severe Pain, Tramadol 50mg q4h PRN for Mod Pain. No LA not required. Pt receiving adequate coverage. For breakthrough pain, IV Tylenol, no IVP Dilaudid required. Pt will likely only require 3 day supply of Percocet    2. Neuropathics  Pt currently denies neuropathic pain. No numbness/tingling/electric shock like sensation in LE/UE b.l.    PLAN: No indication for neuropathic agents.     3. Adjuvants  Pt not complaining of muscle spasms/cramping in neck/back. Ambien q bedtime requested for disturbed sleeping today. Tylenol 650mg q6h PRN for Mild Pain. Pt on Percocet.     PLAN: No need to dc with Valium. C/w Sonata q bedtime.     4. Prophylactic:   Bowel regimen: As above  Nausea PRN: Zofran PRN for Nausea, pt does not c/o current    5. Functional Goals:   Pt will get OOB with PT today. Pt will resume previous level of activity without impairment from surgery.     6. Additional Consults:   None recommended.     7. Additional Labs/Imaging:   None recommended.     8. Follow up, Discharge Planning:   Patient is set for discharge to: [facility]  Discharge is pending: BM, acceptance to facility  Pain Management follow up plan: F/inpatient, no need for outpatient f/u NEUROSURGERY PAIN MANAGEMENT PROGRESS NOTE    INCOMPLETE NOTE    PLAN:   - Decrease Percocet dosing, no use over past 24 hours, pain well controlled without opioids  - Aggressive bowel regimen    HPI:  60yo M w/ PMHx of HLD, h/o cervical cord compression s/p C5/C6 anterior decompression and fusion on 5/18/17 presents with weakness. Pt states that last week he started noticing his legs were more weak and his left foot started dragging. Yesterday, his weakness worsened dramatically to the point that he was unable to walk and get up without assistance. Since his surgery, he has been taking XS tylenol and oxycodone PRN for pain control. He denies urinary or fecal incontinence, with last BM 3-4 days ago. Denies fever, chills, nausea, vomiting, SOB, headache, diarrhea.     In ED, T 97.8F, HR 78, /86, RR 18, 99% on RA. Neurology consulted (Dr. Mitchell) as this patient is well-known to Dr. Mitchell. MRI cervical spine concerning for cord compression. Given decadron 96mg IVPB x1. Neurosurgery consulted who agreed with steroids with possible OR to be discussed tomorrow. (14 Jun 2017 02:04)      PAST MEDICAL & SURGICAL HISTORY:  Cervical stenosis of spine  Hyperlipidemia  S/P cervical spinal fusion: 5/18/17      FAMILY HISTORY:  No pertinent family history in first degree relatives      SOCIAL HISTORY:  Hx of Tobacco use    HOME MEDICATIONS:   Please refer to initial HNP    PAIN HOME MEDICATIONS:  None, Percocet s/p ACDF, but no chronic meds    Allergies    No Known Allergies    Intolerances        PAIN MEDICATIONS:  acetaminophen   Tablet. 650milliGRAM(s) Oral every 6 hours PRN  oxyCODONE  5 mG/acetaminophen 325 mG 1Tablet(s) Oral every 4 hours PRN  oxyCODONE  5 mG/acetaminophen 325 mG 2Tablet(s) Oral every 6 hours PRN  diazepam    Tablet 5milliGRAM(s) Oral every 12 hours PRN  metoclopramide Injectable 10milliGRAM(s) IV Push once PRN  zaleplon 5milliGRAM(s) Oral at bedtime PRN    Heme:  enoxaparin Injectable 40milliGRAM(s) SubCutaneous at bedtime    Antibiotics:    Cardiovascular:  tamsulosin 0.4milliGRAM(s) Oral at bedtime    GI:  polyethylene glycol 3350 17Gram(s) Oral daily  docusate sodium 100milliGRAM(s) Oral three times a day  senna 2Tablet(s) Oral at bedtime  pantoprazole    Tablet 40milliGRAM(s) Oral before breakfast  magnesium hydroxide Suspension 30milliLiter(s) Oral daily PRN  bisacodyl Suppository 10milliGRAM(s) Rectal daily PRN    Endocrine:  dextrose Gel 1Dose(s) Oral once PRN  glucagon  Injectable 1milliGRAM(s) IntraMuscular once PRN  atorvastatin 20milliGRAM(s) Oral at bedtime  insulin lispro (HumaLOG) corrective regimen sliding scale  SubCutaneous Before meals and at bedtime  dextrose Gel 1Dose(s) Oral once PRN  dextrose 50% Injectable 12.5Gram(s) IV Push once  dextrose 50% Injectable 25Gram(s) IV Push once  glucagon  Injectable 1milliGRAM(s) IntraMuscular once PRN  dexamethasone     Tablet 2milliGRAM(s) Oral every 8 hours    All Other Medications:  dextrose 5%. 1000milliLiter(s) IV Continuous <Continuous>      Vital Signs Last 24 Hrs  T(C): 36.7, Max: 37 (06-19 @ 16:48)  T(F): 98.1, Max: 98.6 (06-19 @ 16:48)  HR: 67 (63 - 90)  BP: 152/92 (125/87 - 153/93)  BP(mean): --  RR: 16 (16 - 16)  SpO2: 96% (95% - 96%)    REVIEW OF SYSTEMS:  CONSTITUTIONAL: No fever, weight loss, or fatigue  EYES: No eye pain, visual disturbances, or discharge  ENMT:  No difficulty hearing, tinnitus, vertigo; No sinus or throat pain  NECK: No pain or stiffness  RESPIRATORY: No cough, wheezing, chills or hemoptysis; No shortness of breath.  CARDIOVASCULAR: No chest pain, palpitations  GASTROINTESTINAL: No BM since pre-op, sensation of constipation. No abdominal or epigastric pain. No nausea, vomiting; No diarrhea or constipation.  GENITOURINARY: No dysuria, frequency, hematuria, or incontinence  NEUROLOGICAL: No headaches, memory loss, loss of strength, numbness, or tremors  MUSCULOSKELETAL: Minimal incisional pain. joint pain or swelling; No muscle, back, or extremity pain    FUNCTIONAL ASSESSMENT:  PAIN SCORE AT REST:   Denies      SCALE USED: (1-10 VNRS)  PAIN SCORE WITH ACTIVITY:   Denies      SCALE USED: (1-10 VNRS)    PHYSICAL EXAM  GENERAL: NAD, well-groomed, well-developed  HEAD:  Atraumatic, Normocephalic  EYES: EOMI, PERRLA, conjunctiva and sclera clear  NECK: [place] collar  NERVOUS SYSTEM:    Alert & Oriented X3, Good concentration;   Cranial nerves grossly intact  Motor Strength 5/5 B/L in lower extremities; 5/5 in B/L triceps/biceps, full ROM in UE--full abduction/adduction. Hand  4+/5- in LUE, 5-/5+. Limited fine motor coordination in LUE, 4th, 5th digits--specifically.  Sensation intact to LT in UE/LE in 3 dermatomes  Cervical: No facet tenderness. Negative Queen's sign. Cervical ROM not assessed, s/p surgery and restricted turning.  CHEST/LUNG: Clear to auscultation bilaterally; No rales, rhonchi, wheezing, or rubs  HEART: Regular rate and rhythm; No murmurs, rubs, or gallops  ABDOMEN: Soft, Nontender, Nondistended; Bowel sounds present  SKIN: No rashes or lesions    ASSESSMENT:   62y Male s/p ACDF corpectomy and fusion    PLAN:   1. Opioids  Since yesterday 6am, pt has required no opioids.     PLAN: Will decrease dosing of  Percocet 5mg q4h PRN for Severe Pain, Tramadol 50mg q4h PRN for Mod Pain. No LA not required. Pt receiving adequate coverage. For breakthrough pain, IV Tylenol, no IVP Dilaudid required. Pt will likely only require 3 day supply of Percocet    2. Neuropathics  Pt currently denies neuropathic pain. No numbness/tingling/electric shock like sensation in LE/UE b.l.    PLAN: No indication for neuropathic agents.     3. Adjuvants  Pt not complaining of muscle spasms/cramping in neck/back. Ambien q bedtime requested for disturbed sleeping today. Tylenol 650mg q6h PRN for Mild Pain. Pt on Percocet.     PLAN: Dcd Valium. C/w Sonata q bedtime.     4. Prophylactic:   Bowel regimen: As above  Nausea PRN: Zofran PRN for Nausea, pt does not c/o current    5. Functional Goals:   Pt will get OOB with PT today. Pt will resume previous level of activity without impairment from surgery.     6. Additional Consults:   None recommended.     7. Additional Labs/Imaging:   None recommended.     8. Follow up, Discharge Planning:   Patient is set for discharge to: [facility]  Discharge is pending: BM, acceptance to facility  Pain Management follow up plan: F/inpatient, no need for outpatient f/u NEUROSURGERY PAIN MANAGEMENT PROGRESS NOTE    PLAN:   - Decrease Percocet dosing, no use over past 24 hours, pain well controlled without opioids  - Aggressive bowel regimen    HPI:  62yo M w/ PMHx of HLD, h/o cervical cord compression s/p C5/C6 anterior decompression and fusion on 5/18/17 presents with weakness. Pt states that last week he started noticing his legs were more weak and his left foot started dragging. Yesterday, his weakness worsened dramatically to the point that he was unable to walk and get up without assistance. Since his surgery, he has been taking XS tylenol and oxycodone PRN for pain control. He denies urinary or fecal incontinence, with last BM 3-4 days ago. Denies fever, chills, nausea, vomiting, SOB, headache, diarrhea.     In ED, T 97.8F, HR 78, /86, RR 18, 99% on RA. Neurology consulted (Dr. Mitchell) as this patient is well-known to Dr. Mitchell. MRI cervical spine concerning for cord compression. Given decadron 96mg IVPB x1. Neurosurgery consulted who agreed with steroids with possible OR to be discussed tomorrow. (14 Jun 2017 02:04)      PAST MEDICAL & SURGICAL HISTORY:  Cervical stenosis of spine  Hyperlipidemia  S/P cervical spinal fusion: 5/18/17      FAMILY HISTORY:  No pertinent family history in first degree relatives      SOCIAL HISTORY:  Hx of Tobacco use    HOME MEDICATIONS:   Please refer to initial HNP    PAIN HOME MEDICATIONS:  None, Percocet s/p ACDF, but no chronic meds    Allergies    No Known Allergies    Intolerances        PAIN MEDICATIONS:  acetaminophen   Tablet. 650milliGRAM(s) Oral every 6 hours PRN  oxyCODONE  5 mG/acetaminophen 325 mG 1Tablet(s) Oral every 4 hours PRN  oxyCODONE  5 mG/acetaminophen 325 mG 2Tablet(s) Oral every 6 hours PRN  diazepam    Tablet 5milliGRAM(s) Oral every 12 hours PRN  metoclopramide Injectable 10milliGRAM(s) IV Push once PRN  zaleplon 5milliGRAM(s) Oral at bedtime PRN    Heme:  enoxaparin Injectable 40milliGRAM(s) SubCutaneous at bedtime    Antibiotics:    Cardiovascular:  tamsulosin 0.4milliGRAM(s) Oral at bedtime    GI:  polyethylene glycol 3350 17Gram(s) Oral daily  docusate sodium 100milliGRAM(s) Oral three times a day  senna 2Tablet(s) Oral at bedtime  pantoprazole    Tablet 40milliGRAM(s) Oral before breakfast  magnesium hydroxide Suspension 30milliLiter(s) Oral daily PRN  bisacodyl Suppository 10milliGRAM(s) Rectal daily PRN    Endocrine:  dextrose Gel 1Dose(s) Oral once PRN  glucagon  Injectable 1milliGRAM(s) IntraMuscular once PRN  atorvastatin 20milliGRAM(s) Oral at bedtime  insulin lispro (HumaLOG) corrective regimen sliding scale  SubCutaneous Before meals and at bedtime  dextrose Gel 1Dose(s) Oral once PRN  dextrose 50% Injectable 12.5Gram(s) IV Push once  dextrose 50% Injectable 25Gram(s) IV Push once  glucagon  Injectable 1milliGRAM(s) IntraMuscular once PRN  dexamethasone     Tablet 2milliGRAM(s) Oral every 8 hours    All Other Medications:  dextrose 5%. 1000milliLiter(s) IV Continuous <Continuous>      Vital Signs Last 24 Hrs  T(C): 36.7, Max: 37 (06-19 @ 16:48)  T(F): 98.1, Max: 98.6 (06-19 @ 16:48)  HR: 67 (63 - 90)  BP: 152/92 (125/87 - 153/93)  BP(mean): --  RR: 16 (16 - 16)  SpO2: 96% (95% - 96%)    REVIEW OF SYSTEMS:  CONSTITUTIONAL: No fever, weight loss, or fatigue  EYES: No eye pain, visual disturbances, or discharge  ENMT:  No difficulty hearing, tinnitus, vertigo; No sinus or throat pain  NECK: No pain or stiffness  RESPIRATORY: No cough, wheezing, chills or hemoptysis; No shortness of breath.  CARDIOVASCULAR: No chest pain, palpitations  GASTROINTESTINAL: BM yesterday. No abdominal or epigastric pain. No nausea, vomiting; No diarrhea or constipation.  GENITOURINARY: No dysuria, frequency, hematuria, or incontinence  NEUROLOGICAL: No headaches, memory loss, loss of strength, numbness, or tremors  MUSCULOSKELETAL: Minimal incisional pain. No joint pain or swelling; No muscle, back, or extremity pain    FUNCTIONAL ASSESSMENT:  PAIN SCORE AT REST:   Denies      SCALE USED: (1-10 VNRS)  PAIN SCORE WITH ACTIVITY:   Denies      SCALE USED: (1-10 VNRS)    PHYSICAL EXAM  GENERAL: NAD, well-groomed, well-developed  HEAD:  Atraumatic, Normocephalic  EYES: EOMI, PERRLA, conjunctiva and sclera clear  NECK: [place] collar  NERVOUS SYSTEM:    Alert & Oriented X3, Good concentration;   Cranial nerves grossly intact  Motor Strength 5/5 B/L in lower extremities; 5/5 in B/L triceps/biceps, full ROM in UE--full abduction/adduction. Hand  4+/5- in LUE, 5-/5+.  Sensation intact to LT in UE/LE in 3 dermatomes  Cervical: No facet tenderness. Negative Queen's sign. Cervical ROM not assessed, s/p surgery and restricted turning.  CHEST/LUNG: Clear to auscultation bilaterally; No rales, rhonchi, wheezing, or rubs  HEART: Regular rate and rhythm; No murmurs, rubs, or gallops  ABDOMEN: Soft, Nontender, Nondistended; Bowel sounds present  SKIN: No rashes or lesions    ASSESSMENT:   62y Male s/p ACDF corpectomy and fusion    PLAN:   1. Opioids  Since yesterday 6am, pt has required no opioids.     PLAN: Will decrease dosing of  Percocet 5mg q4h PRN for Severe Pain, Tramadol 50mg q4h PRN for Mod Pain. No LA not required. Pt receiving adequate coverage. For breakthrough pain, IV Tylenol, no IVP Dilaudid required. Pt will likely only require 3 day supply of Percocet    2. Neuropathics  Pt currently denies neuropathic pain. No numbness/tingling/electric shock like sensation in LE/UE b.l.    PLAN: No indication for neuropathic agents.     3. Adjuvants  Pt not complaining of muscle spasms/cramping in neck/back. Ambien q bedtime requested for disturbed sleeping today. Tylenol 650mg q6h PRN for Mild Pain. Pt on Percocet.     PLAN: Dcd Valium. C/w Sonata q bedtime.     4. Prophylactic:   Bowel regimen: As above  Nausea PRN: Zofran PRN for Nausea, pt does not c/o current    5. Functional Goals:   Pt will get OOB with PT today. Pt will resume previous level of activity without impairment from surgery.     6. Additional Consults:   None recommended.     7. Additional Labs/Imaging:   None recommended.     8. Follow up, Discharge Planning:   Patient is set for discharge to: [facility]  Discharge is pending: BM, acceptance to facility  Pain Management follow up plan: F/inpatient, no need for outpatient f/u

## 2017-06-21 VITALS
DIASTOLIC BLOOD PRESSURE: 89 MMHG | HEART RATE: 69 BPM | RESPIRATION RATE: 16 BRPM | TEMPERATURE: 98 F | SYSTOLIC BLOOD PRESSURE: 133 MMHG | OXYGEN SATURATION: 96 %

## 2017-06-21 PROCEDURE — 97530 THERAPEUTIC ACTIVITIES: CPT

## 2017-06-21 PROCEDURE — 86900 BLOOD TYPING SEROLOGIC ABO: CPT

## 2017-06-21 PROCEDURE — 76000 FLUOROSCOPY <1 HR PHYS/QHP: CPT

## 2017-06-21 PROCEDURE — 80053 COMPREHEN METABOLIC PANEL: CPT

## 2017-06-21 PROCEDURE — 83735 ASSAY OF MAGNESIUM: CPT

## 2017-06-21 PROCEDURE — 72125 CT NECK SPINE W/O DYE: CPT

## 2017-06-21 PROCEDURE — 97116 GAIT TRAINING THERAPY: CPT

## 2017-06-21 PROCEDURE — C1713: CPT

## 2017-06-21 PROCEDURE — 86901 BLOOD TYPING SEROLOGIC RH(D): CPT

## 2017-06-21 PROCEDURE — 88311 DECALCIFY TISSUE: CPT

## 2017-06-21 PROCEDURE — A9585: CPT

## 2017-06-21 PROCEDURE — C1889: CPT

## 2017-06-21 PROCEDURE — 85027 COMPLETE CBC AUTOMATED: CPT

## 2017-06-21 PROCEDURE — 95940 IONM IN OPERATNG ROOM 15 MIN: CPT

## 2017-06-21 PROCEDURE — 93970 EXTREMITY STUDY: CPT

## 2017-06-21 PROCEDURE — 71045 X-RAY EXAM CHEST 1 VIEW: CPT

## 2017-06-21 PROCEDURE — 96374 THER/PROPH/DIAG INJ IV PUSH: CPT

## 2017-06-21 PROCEDURE — 84100 ASSAY OF PHOSPHORUS: CPT

## 2017-06-21 PROCEDURE — 72156 MRI NECK SPINE W/O & W/DYE: CPT

## 2017-06-21 PROCEDURE — 36415 COLL VENOUS BLD VENIPUNCTURE: CPT

## 2017-06-21 PROCEDURE — 85610 PROTHROMBIN TIME: CPT

## 2017-06-21 PROCEDURE — 86140 C-REACTIVE PROTEIN: CPT

## 2017-06-21 PROCEDURE — 72146 MRI CHEST SPINE W/O DYE: CPT

## 2017-06-21 PROCEDURE — 93005 ELECTROCARDIOGRAM TRACING: CPT

## 2017-06-21 PROCEDURE — 86803 HEPATITIS C AB TEST: CPT

## 2017-06-21 PROCEDURE — 86850 RBC ANTIBODY SCREEN: CPT

## 2017-06-21 PROCEDURE — 99285 EMERGENCY DEPT VISIT HI MDM: CPT | Mod: 25

## 2017-06-21 PROCEDURE — 83036 HEMOGLOBIN GLYCOSYLATED A1C: CPT

## 2017-06-21 PROCEDURE — 97162 PT EVAL MOD COMPLEX 30 MIN: CPT

## 2017-06-21 PROCEDURE — 88304 TISSUE EXAM BY PATHOLOGIST: CPT

## 2017-06-21 PROCEDURE — 97161 PT EVAL LOW COMPLEX 20 MIN: CPT

## 2017-06-21 PROCEDURE — 88300 SURGICAL PATH GROSS: CPT

## 2017-06-21 PROCEDURE — 72052 X-RAY EXAM NECK SPINE 6/>VWS: CPT

## 2017-06-21 PROCEDURE — 80048 BASIC METABOLIC PNL TOTAL CA: CPT

## 2017-06-21 PROCEDURE — 85025 COMPLETE CBC W/AUTO DIFF WBC: CPT

## 2017-06-21 PROCEDURE — 85730 THROMBOPLASTIN TIME PARTIAL: CPT

## 2017-06-21 PROCEDURE — 85652 RBC SED RATE AUTOMATED: CPT

## 2017-06-21 RX ORDER — OXYCODONE HYDROCHLORIDE 5 MG/1
1 TABLET ORAL
Qty: 0 | Refills: 0 | COMMUNITY
Start: 2017-06-21

## 2017-06-21 RX ORDER — ACETAMINOPHEN 500 MG
2 TABLET ORAL
Qty: 0 | Refills: 0 | COMMUNITY
Start: 2017-06-21

## 2017-06-21 RX ORDER — TAMSULOSIN HYDROCHLORIDE 0.4 MG/1
1 CAPSULE ORAL
Qty: 0 | Refills: 0 | COMMUNITY
Start: 2017-06-21

## 2017-06-21 RX ORDER — DEXAMETHASONE 0.5 MG/5ML
1 ELIXIR ORAL
Qty: 0 | Refills: 0 | COMMUNITY
Start: 2017-06-21

## 2017-06-21 RX ORDER — DOCUSATE SODIUM 100 MG
1 CAPSULE ORAL
Qty: 0 | Refills: 0 | COMMUNITY
Start: 2017-06-21

## 2017-06-21 RX ORDER — METOCLOPRAMIDE HCL 10 MG
0 TABLET ORAL
Qty: 0 | Refills: 0 | COMMUNITY
Start: 2017-06-21

## 2017-06-21 RX ORDER — INSULIN LISPRO 100/ML
0 VIAL (ML) SUBCUTANEOUS
Qty: 0 | Refills: 0 | COMMUNITY
Start: 2017-06-21

## 2017-06-21 RX ORDER — ZALEPLON 10 MG
1 CAPSULE ORAL
Qty: 0 | Refills: 0 | COMMUNITY
Start: 2017-06-21

## 2017-06-21 RX ORDER — PANTOPRAZOLE SODIUM 20 MG/1
1 TABLET, DELAYED RELEASE ORAL
Qty: 0 | Refills: 0 | COMMUNITY
Start: 2017-06-21

## 2017-06-21 RX ORDER — POLYETHYLENE GLYCOL 3350 17 G/17G
17 POWDER, FOR SOLUTION ORAL
Qty: 0 | Refills: 0 | COMMUNITY
Start: 2017-06-21

## 2017-06-21 RX ORDER — SENNA PLUS 8.6 MG/1
2 TABLET ORAL
Qty: 0 | Refills: 0 | COMMUNITY
Start: 2017-06-21

## 2017-06-21 RX ORDER — ENOXAPARIN SODIUM 100 MG/ML
40 INJECTION SUBCUTANEOUS
Qty: 0 | Refills: 0 | COMMUNITY
Start: 2017-06-21

## 2017-06-21 RX ORDER — MAGNESIUM HYDROXIDE 400 MG/1
30 TABLET, CHEWABLE ORAL
Qty: 0 | Refills: 0 | COMMUNITY
Start: 2017-06-21

## 2017-06-21 RX ADMIN — Medication 2 MILLIGRAM(S): at 13:12

## 2017-06-21 RX ADMIN — PANTOPRAZOLE SODIUM 40 MILLIGRAM(S): 20 TABLET, DELAYED RELEASE ORAL at 06:17

## 2017-06-21 RX ADMIN — Medication 5 MILLIGRAM(S): at 01:49

## 2017-06-21 RX ADMIN — Medication 100 MILLIGRAM(S): at 06:17

## 2017-06-21 RX ADMIN — Medication 100 MILLIGRAM(S): at 13:12

## 2017-06-21 RX ADMIN — POLYETHYLENE GLYCOL 3350 17 GRAM(S): 17 POWDER, FOR SOLUTION ORAL at 11:05

## 2017-06-21 RX ADMIN — Medication 2 MILLIGRAM(S): at 06:17

## 2017-06-21 NOTE — PROGRESS NOTE ADULT - SUBJECTIVE AND OBJECTIVE BOX
Neurosurgery PA note   S/P day 6  Reexploration of previous C5-C6 corpectomy with C4-C7 fusion anteriorly . Patient admit  to progressive improvident of  lower extremities  strength. Ambulates with walker.  + BM yesterday.  awaiting acute  rehab. placement.       T(C): 36.6, Max: 37.3 (06-20 @ 22:00)  HR: 75 (62 - 75)  BP: 129/81 (129/81 - 167/90)  RR: 14 (14 - 18)  SpO2: 98% (94% - 98%)  Wt(kg): --    Exam:NAD, AAOx3  PERRL. EOMI.  +Hard collar, cervical incision C/D/I on left  Lungs clear b/l  Heart S1, S2. NSR.  Abd soft, NT/ND. +BS  Pulses 2+ throughout  CNs II-XII intact. 5/5 str in UEs b/l. Left foot dorsiflexion 4/5, o/w 5/5 throughout. +Hyperreflexia in LEs, few beats of clonus. Sensation to LT intact throughout. Broad based gait w/ walker.    Plan: OOB ambulate  SW for acute rehab placement.   F/U with Dr. Fournier  Wear hard collar at all times.                      Wound:    Imaging:    Assessment/Plan:

## 2017-06-21 NOTE — PROGRESS NOTE ADULT - SUBJECTIVE AND OBJECTIVE BOX
NEUROSURGERY PAIN MANAGEMENT PROGRESS NOTE    **incomplete note**    PLAN:   - C/w current regimen    HPI:  62yo M w/ PMHx of HLD, h/o cervical cord compression s/p C5/C6 anterior decompression and fusion on 5/18/17 presents with weakness. Pt states that last week he started noticing his legs were more weak and his left foot started dragging. Yesterday, his weakness worsened dramatically to the point that he was unable to walk and get up without assistance. Since his surgery, he has been taking XS tylenol and oxycodone PRN for pain control. He denies urinary or fecal incontinence, with last BM 3-4 days ago. Denies fever, chills, nausea, vomiting, SOB, headache, diarrhea.     In ED, T 97.8F, HR 78, /86, RR 18, 99% on RA. Neurology consulted (Dr. Mitchell) as this patient is well-known to Dr. Mitchell. MRI cervical spine concerning for cord compression. Given decadron 96mg IVPB x1. Neurosurgery consulted who agreed with steroids with possible OR to be discussed tomorrow. (14 Jun 2017 02:04)      PAST MEDICAL & SURGICAL HISTORY:  Cervical stenosis of spine  Hyperlipidemia  S/P cervical spinal fusion: 5/18/17      FAMILY HISTORY:  No pertinent family history in first degree relatives      SOCIAL HISTORY:  [ ] Denies Smoking, Alcohol, or Drug Use    HOME MEDICATIONS:   Please refer to initial HNP    PAIN HOME MEDICATIONS:    Allergies    No Known Allergies    Intolerances        PAIN MEDICATIONS:  acetaminophen   Tablet. 650milliGRAM(s) Oral every 6 hours PRN  metoclopramide Injectable 10milliGRAM(s) IV Push once PRN  zaleplon 5milliGRAM(s) Oral at bedtime PRN  oxyCODONE  5 mG/acetaminophen 325 mG 1Tablet(s) Oral every 4 hours PRN  traMADol 50milliGRAM(s) Oral every 4 hours PRN    Heme:  enoxaparin Injectable 40milliGRAM(s) SubCutaneous at bedtime    Antibiotics:    Cardiovascular:  tamsulosin 0.4milliGRAM(s) Oral at bedtime    GI:  polyethylene glycol 3350 17Gram(s) Oral daily  docusate sodium 100milliGRAM(s) Oral three times a day  senna 2Tablet(s) Oral at bedtime  pantoprazole    Tablet 40milliGRAM(s) Oral before breakfast  magnesium hydroxide Suspension 30milliLiter(s) Oral daily PRN  bisacodyl Suppository 10milliGRAM(s) Rectal daily PRN    Endocrine:  dextrose Gel 1Dose(s) Oral once PRN  glucagon  Injectable 1milliGRAM(s) IntraMuscular once PRN  atorvastatin 20milliGRAM(s) Oral at bedtime  insulin lispro (HumaLOG) corrective regimen sliding scale  SubCutaneous Before meals and at bedtime  dextrose Gel 1Dose(s) Oral once PRN  dextrose 50% Injectable 12.5Gram(s) IV Push once  dextrose 50% Injectable 25Gram(s) IV Push once  glucagon  Injectable 1milliGRAM(s) IntraMuscular once PRN  dexamethasone     Tablet 2milliGRAM(s) Oral every 8 hours    All Other Medications:  dextrose 5%. 1000milliLiter(s) IV Continuous <Continuous>      Vital Signs Last 24 Hrs  T(C): 36.6, Max: 37.3 (06-20 @ 22:00)  T(F): 97.8, Max: 99.1 (06-20 @ 22:00)  HR: 75 (62 - 75)  BP: 129/81 (129/81 - 167/90)  BP(mean): --  RR: 14 (14 - 18)  SpO2: 98% (94% - 98%)    REVIEW OF SYSTEMS:  CONSTITUTIONAL: No fever, weight loss, or fatigue  EYES: No eye pain, visual disturbances, or discharge  ENMT:  No difficulty hearing, tinnitus, vertigo; No sinus or throat pain  NECK: No pain or stiffness  RESPIRATORY: No cough, wheezing, chills or hemoptysis; No shortness of breath.  CARDIOVASCULAR: No chest pain, palpitations  GASTROINTESTINAL: BM yesterday. No abdominal or epigastric pain. No nausea, vomiting; No diarrhea or constipation.  GENITOURINARY: No dysuria, frequency, hematuria, or incontinence  NEUROLOGICAL: No headaches, memory loss, loss of strength, numbness, or tremors  MUSCULOSKELETAL: Minimal incisional pain. No joint pain or swelling; No muscle, back, or extremity pain    FUNCTIONAL ASSESSMENT:  PAIN SCORE AT REST:   Denies      SCALE USED: (1-10 VNRS)  PAIN SCORE WITH ACTIVITY:   Denies      SCALE USED: (1-10 VNRS)    PHYSICAL EXAM  GENERAL: NAD, well-groomed, well-developed  HEAD:  Atraumatic, Normocephalic  EYES: EOMI, PERRLA, conjunctiva and sclera clear  NECK: [place] collar  NERVOUS SYSTEM:    Alert & Oriented X3, Good concentration;   Cranial nerves grossly intact  Motor Strength 5/5 B/L in lower extremities; 5/5 in B/L triceps/biceps, full ROM in UE--full abduction/adduction. Hand  4+/5- in LUE, 5-/5+.  Sensation intact to LT in UE/LE in 3 dermatomes  Cervical: No facet tenderness. Negative Queen's sign. Cervical ROM not assessed, s/p surgery and restricted turning.  CHEST/LUNG: Clear to auscultation bilaterally; No rales, rhonchi, wheezing, or rubs  HEART: Regular rate and rhythm; No murmurs, rubs, or gallops  ABDOMEN: Soft, Nontender, Nondistended; Bowel sounds present  SKIN: No rashes or lesions    ASSESSMENT:   62y Male s/p ACDF corpectomy and fusion    PLAN:   1. Opioids  Since yesterday 6am, pt has required no opioids.     PLAN: Will decrease dosing of  Percocet 5mg q4h PRN for Severe Pain, Tramadol 50mg q4h PRN for Mod Pain. No LA not required. Pt receiving adequate coverage. For breakthrough pain, IV Tylenol, no IVP Dilaudid required. Pt will likely only require 3 day supply of Percocet    2. Neuropathics  Pt currently denies neuropathic pain. No numbness/tingling/electric shock like sensation in LE/UE b.l.    PLAN: No indication for neuropathic agents.     3. Adjuvants  Pt not complaining of muscle spasms/cramping in neck/back. Ambien q bedtime requested for disturbed sleeping today. Tylenol 650mg q6h PRN for Mild Pain. Pt on Percocet.     PLAN: Dcd Valium. C/w Sonata q bedtime.     4. Prophylactic:   Bowel regimen: As above  Nausea PRN: Zofran PRN for Nausea, pt does not c/o current    5. Functional Goals:   Pt will get OOB with PT today. Pt will resume previous level of activity without impairment from surgery.     6. Additional Consults:   None recommended.     7. Additional Labs/Imaging:   None recommended.     8. Follow up, Discharge Planning:   Patient is set for discharge to: [facility]  Discharge is pending: BM, acceptance to facility  Pain Management follow up plan: F/inpatient, no need for outpatient f/u NEUROSURGERY PAIN MANAGEMENT PROGRESS NOTE    PLAN:   - C/w current regimen  - Plan for dc to [place]    HPI:  62yo M w/ PMHx of HLD, h/o cervical cord compression s/p C5/C6 anterior decompression and fusion on 5/18/17 presents with weakness. Pt states that last week he started noticing his legs were more weak and his left foot started dragging. Yesterday, his weakness worsened dramatically to the point that he was unable to walk and get up without assistance. Since his surgery, he has been taking XS tylenol and oxycodone PRN for pain control. He denies urinary or fecal incontinence, with last BM 3-4 days ago. Denies fever, chills, nausea, vomiting, SOB, headache, diarrhea.     In ED, T 97.8F, HR 78, /86, RR 18, 99% on RA. Neurology consulted (Dr. Mitchell) as this patient is well-known to Dr. Mitchell. MRI cervical spine concerning for cord compression. Given decadron 96mg IVPB x1. Neurosurgery consulted who agreed with steroids with possible OR to be discussed tomorrow. (14 Jun 2017 02:04)      PAST MEDICAL & SURGICAL HISTORY:  Cervical stenosis of spine  Hyperlipidemia  S/P cervical spinal fusion: 5/18/17      FAMILY HISTORY:  No pertinent family history in first degree relatives      SOCIAL HISTORY:  Hx of social ETOH use    HOME MEDICATIONS:   Please refer to initial HNP    PAIN HOME MEDICATIONS:  Percocet s/p ACDF    Allergies    No Known Allergies    Intolerances    PAIN MEDICATIONS:  acetaminophen   Tablet. 650milliGRAM(s) Oral every 6 hours PRN  metoclopramide Injectable 10milliGRAM(s) IV Push once PRN  zaleplon 5milliGRAM(s) Oral at bedtime PRN  oxyCODONE  5 mG/acetaminophen 325 mG 1Tablet(s) Oral every 4 hours PRN  traMADol 50milliGRAM(s) Oral every 4 hours PRN    Heme:  enoxaparin Injectable 40milliGRAM(s) SubCutaneous at bedtime    Antibiotics:    Cardiovascular:  tamsulosin 0.4milliGRAM(s) Oral at bedtime    GI:  polyethylene glycol 3350 17Gram(s) Oral daily  docusate sodium 100milliGRAM(s) Oral three times a day  senna 2Tablet(s) Oral at bedtime  pantoprazole    Tablet 40milliGRAM(s) Oral before breakfast  magnesium hydroxide Suspension 30milliLiter(s) Oral daily PRN  bisacodyl Suppository 10milliGRAM(s) Rectal daily PRN    Endocrine:  dextrose Gel 1Dose(s) Oral once PRN  glucagon  Injectable 1milliGRAM(s) IntraMuscular once PRN  atorvastatin 20milliGRAM(s) Oral at bedtime  insulin lispro (HumaLOG) corrective regimen sliding scale  SubCutaneous Before meals and at bedtime  dextrose Gel 1Dose(s) Oral once PRN  dextrose 50% Injectable 12.5Gram(s) IV Push once  dextrose 50% Injectable 25Gram(s) IV Push once  glucagon  Injectable 1milliGRAM(s) IntraMuscular once PRN  dexamethasone     Tablet 2milliGRAM(s) Oral every 8 hours    All Other Medications:  dextrose 5%. 1000milliLiter(s) IV Continuous <Continuous>      Vital Signs Last 24 Hrs  T(C): 36.6, Max: 37.3 (06-20 @ 22:00)  T(F): 97.8, Max: 99.1 (06-20 @ 22:00)  HR: 75 (62 - 75)  BP: 129/81 (129/81 - 167/90)  BP(mean): --  RR: 14 (14 - 18)  SpO2: 98% (94% - 98%)    REVIEW OF SYSTEMS:  CONSTITUTIONAL: No fever, weight loss, or fatigue  EYES: No eye pain, visual disturbances, or discharge  ENMT:  No difficulty hearing, tinnitus, vertigo; No sinus or throat pain  NECK: No pain or stiffness  RESPIRATORY: No cough, wheezing, chills or hemoptysis; No shortness of breath.  CARDIOVASCULAR: No chest pain, palpitations  GASTROINTESTINAL: Regular BM (1x/1-2days). No abdominal or epigastric pain. No nausea, vomiting; No diarrhea or constipation.  GENITOURINARY: No dysuria, frequency, hematuria, or incontinence  NEUROLOGICAL: No headaches, memory loss, numbness, or tremors. Subjective LOS in L calf > R calf.   MUSCULOSKELETAL: Minimal incisional pain. No joint pain or swelling; No muscle, back, or extremity pain.     FUNCTIONAL ASSESSMENT:  PAIN SCORE AT REST:   Denies      SCALE USED: (1-10 VNRS)  PAIN SCORE WITH ACTIVITY:   Denies      SCALE USED: (1-10 VNRS)    PHYSICAL EXAM  GENERAL: NAD, well-groomed, well-developed  HEAD:  Atraumatic, Normocephalic  EYES: EOMI, PERRLA, conjunctiva and sclera clear  NECK: [place] collar  NERVOUS SYSTEM:    Alert & Oriented X3, Good concentration;   Cranial nerves grossly intact  Motor Strength 5/5 B/L in lower extremities; 5/5 in B/L triceps/biceps, full ROM in UE--full abduction/adduction. Hand  5-/5+ in LUE-- 5th/4th digits, 5-/5+ RUE.  Sensation intact to LT in UE/LE in 3 dermatomes  Cervical: No facet tenderness. Cervical ROM not assessed, s/p surgery and restricted turning.  CHEST/LUNG: Clear to auscultation bilaterally; No rales, rhonchi, wheezing, or rubs  HEART: Regular rate and rhythm; No murmurs, rubs, or gallops  ABDOMEN: Soft, Nontender, Nondistended; Bowel sounds present  SKIN: No rashes or lesions    ASSESSMENT:   62y Male s/p ACDF corpectomy and fusion    PLAN:   1. Opioids  Since yesterday 6am, pt has required no opioids.     PLAN: Going to [place] rehab today, will continue Percocet 5mg q4h PRN for Severe Pain, For breakthrough pain, IV Tylenol, no IVP Dilaudid required. Pt will likely only require 3 day supply of Percocet    2. Neuropathics  Pt currently denies neuropathic pain. No numbness/tingling/electric shock like sensation in LE/UE b.l.    PLAN: No indication for neuropathic agents.     3. Adjuvants  Pt not complaining of muscle spasms/cramping in neck/back. Ambien q bedtime requested for disturbed sleeping--switched to sonata. Tylenol 650mg q6h PRN for Mild Pain. Pt on Percocet.     PLAN: Dcd Valium. C/w Sonata q bedtime.     4. Prophylactic:   Bowel regimen: As above  Nausea PRN: Zofran PRN for Nausea, pt does not c/o current    5. Functional Goals:   Pt will get OOB with PT today. Pt will resume previous level of activity without impairment from surgery.     6. Additional Consults:   None recommended.     7. Additional Labs/Imaging:   None recommended.     8. Follow up, Discharge Planning:   Patient is set for discharge to: [facility]  Discharge is pending: bed at facility  Pain Management follow up plan: F/inpatient, no need for outpatient f/u

## 2017-06-23 DIAGNOSIS — E78.5 HYPERLIPIDEMIA, UNSPECIFIED: ICD-10-CM

## 2017-06-23 DIAGNOSIS — R33.9 RETENTION OF URINE, UNSPECIFIED: ICD-10-CM

## 2017-06-23 DIAGNOSIS — M50.022 CERVICAL DISC DISORDER AT C5-C6 LEVEL WITH MYELOPATHY: ICD-10-CM

## 2017-06-23 DIAGNOSIS — E66.9 OBESITY, UNSPECIFIED: ICD-10-CM

## 2017-06-23 DIAGNOSIS — R53.1 WEAKNESS: ICD-10-CM

## 2017-06-23 DIAGNOSIS — K59.00 CONSTIPATION, UNSPECIFIED: ICD-10-CM

## 2017-07-11 ENCOUNTER — FORM ENCOUNTER (OUTPATIENT)
Age: 62
End: 2017-07-11

## 2017-07-11 PROBLEM — Z78.9 DOES NOT USE ILLICIT DRUGS: Status: ACTIVE | Noted: 2017-04-26

## 2017-07-11 PROBLEM — Z96.649 S/P TOTAL HIP RESURFACING: Status: RESOLVED | Noted: 2017-04-26 | Resolved: 2017-07-11

## 2017-07-11 PROBLEM — Z78.9 RARELY CONSUMES ALCOHOL: Status: ACTIVE | Noted: 2017-04-26

## 2017-07-11 PROBLEM — Z87.891 FORMER SMOKER: Status: ACTIVE | Noted: 2017-04-26

## 2017-07-12 ENCOUNTER — APPOINTMENT (OUTPATIENT)
Dept: SPINE | Facility: CLINIC | Age: 62
End: 2017-07-12

## 2017-07-12 ENCOUNTER — OUTPATIENT (OUTPATIENT)
Dept: OUTPATIENT SERVICES | Facility: HOSPITAL | Age: 62
LOS: 1 days | End: 2017-07-12
Payer: COMMERCIAL

## 2017-07-12 VITALS
HEIGHT: 69 IN | HEART RATE: 93 BPM | OXYGEN SATURATION: 93 % | BODY MASS INDEX: 26.96 KG/M2 | SYSTOLIC BLOOD PRESSURE: 133 MMHG | DIASTOLIC BLOOD PRESSURE: 92 MMHG | WEIGHT: 182 LBS

## 2017-07-12 DIAGNOSIS — Z78.9 OTHER SPECIFIED HEALTH STATUS: ICD-10-CM

## 2017-07-12 DIAGNOSIS — Z96.649 PRESENCE OF UNSPECIFIED ARTIFICIAL HIP JOINT: ICD-10-CM

## 2017-07-12 DIAGNOSIS — Z98.1 ARTHRODESIS STATUS: Chronic | ICD-10-CM

## 2017-07-12 DIAGNOSIS — Z87.891 PERSONAL HISTORY OF NICOTINE DEPENDENCE: ICD-10-CM

## 2017-07-12 PROCEDURE — 72040 X-RAY EXAM NECK SPINE 2-3 VW: CPT

## 2017-07-12 PROCEDURE — 72040 X-RAY EXAM NECK SPINE 2-3 VW: CPT | Mod: 26

## 2017-08-07 PROCEDURE — 63082 REMOVE VERTEBRAL BODY ADD-ON: CPT | Mod: 80

## 2017-08-07 PROCEDURE — 22554 ARTHRD ANT NTRBD MIN DSC CRV: CPT | Mod: 80

## 2017-08-07 PROCEDURE — 63081 REMOVE VERT BODY DCMPRN CRVL: CPT | Mod: 80

## 2017-08-07 PROCEDURE — 22854 INSJ BIOMECHANICAL DEVICE: CPT | Mod: 80

## 2017-08-07 PROCEDURE — 22846 INSERT SPINE FIXATION DEVICE: CPT | Mod: 80,59

## 2017-08-07 PROCEDURE — 22585 ARTHRD ANT NTRBD MIN DSC EA: CPT | Mod: 80

## 2017-08-14 ENCOUNTER — FORM ENCOUNTER (OUTPATIENT)
Age: 62
End: 2017-08-14

## 2017-08-15 ENCOUNTER — OUTPATIENT (OUTPATIENT)
Dept: OUTPATIENT SERVICES | Facility: HOSPITAL | Age: 62
LOS: 1 days | End: 2017-08-15
Payer: COMMERCIAL

## 2017-08-15 ENCOUNTER — APPOINTMENT (OUTPATIENT)
Dept: SPINE | Facility: CLINIC | Age: 62
End: 2017-08-15
Payer: COMMERCIAL

## 2017-08-15 VITALS
HEART RATE: 69 BPM | HEIGHT: 69 IN | WEIGHT: 182 LBS | BODY MASS INDEX: 26.96 KG/M2 | DIASTOLIC BLOOD PRESSURE: 92 MMHG | OXYGEN SATURATION: 96 % | SYSTOLIC BLOOD PRESSURE: 126 MMHG

## 2017-08-15 DIAGNOSIS — Z09 ENCOUNTER FOR FOLLOW-UP EXAMINATION AFTER COMPLETED TREATMENT FOR CONDITIONS OTHER THAN MALIGNANT NEOPLASM: ICD-10-CM

## 2017-08-15 DIAGNOSIS — Z98.1 ARTHRODESIS STATUS: Chronic | ICD-10-CM

## 2017-08-15 PROCEDURE — 72040 X-RAY EXAM NECK SPINE 2-3 VW: CPT

## 2017-08-15 PROCEDURE — 72040 X-RAY EXAM NECK SPINE 2-3 VW: CPT | Mod: 26

## 2017-08-15 PROCEDURE — 99024 POSTOP FOLLOW-UP VISIT: CPT

## 2017-09-14 ENCOUNTER — FORM ENCOUNTER (OUTPATIENT)
Age: 62
End: 2017-09-14

## 2017-09-15 ENCOUNTER — OUTPATIENT (OUTPATIENT)
Dept: OUTPATIENT SERVICES | Facility: HOSPITAL | Age: 62
LOS: 1 days | End: 2017-09-15
Payer: COMMERCIAL

## 2017-09-15 DIAGNOSIS — Z98.1 ARTHRODESIS STATUS: Chronic | ICD-10-CM

## 2017-09-15 PROCEDURE — 72040 X-RAY EXAM NECK SPINE 2-3 VW: CPT | Mod: 26

## 2017-09-15 PROCEDURE — 72040 X-RAY EXAM NECK SPINE 2-3 VW: CPT

## 2017-09-20 ENCOUNTER — MOBILE ON CALL (OUTPATIENT)
Age: 62
End: 2017-09-20

## 2017-11-14 ENCOUNTER — OUTPATIENT (OUTPATIENT)
Dept: OUTPATIENT SERVICES | Facility: HOSPITAL | Age: 62
LOS: 1 days | End: 2017-11-14
Payer: COMMERCIAL

## 2017-11-14 ENCOUNTER — APPOINTMENT (OUTPATIENT)
Dept: NEUROLOGY | Facility: CLINIC | Age: 62
End: 2017-11-14
Payer: COMMERCIAL

## 2017-11-14 VITALS
HEIGHT: 69 IN | HEART RATE: 90 BPM | OXYGEN SATURATION: 95 % | BODY MASS INDEX: 28.14 KG/M2 | WEIGHT: 190 LBS | DIASTOLIC BLOOD PRESSURE: 83 MMHG | SYSTOLIC BLOOD PRESSURE: 133 MMHG

## 2017-11-14 DIAGNOSIS — R29.898 OTHER SYMPTOMS AND SIGNS INVOLVING THE MUSCULOSKELETAL SYSTEM: ICD-10-CM

## 2017-11-14 DIAGNOSIS — Z98.1 ARTHRODESIS STATUS: Chronic | ICD-10-CM

## 2017-11-14 PROCEDURE — 72040 X-RAY EXAM NECK SPINE 2-3 VW: CPT

## 2017-11-14 PROCEDURE — 72040 X-RAY EXAM NECK SPINE 2-3 VW: CPT | Mod: 26

## 2017-11-14 PROCEDURE — 99215 OFFICE O/P EST HI 40 MIN: CPT

## 2017-11-15 PROBLEM — R29.898 LEFT LEG WEAKNESS: Status: ACTIVE | Noted: 2017-07-12

## 2017-12-17 ENCOUNTER — FORM ENCOUNTER (OUTPATIENT)
Age: 62
End: 2017-12-17

## 2017-12-18 ENCOUNTER — OUTPATIENT (OUTPATIENT)
Dept: OUTPATIENT SERVICES | Facility: HOSPITAL | Age: 62
LOS: 1 days | End: 2017-12-18
Payer: COMMERCIAL

## 2017-12-18 DIAGNOSIS — Z98.1 ARTHRODESIS STATUS: Chronic | ICD-10-CM

## 2017-12-18 PROCEDURE — 72040 X-RAY EXAM NECK SPINE 2-3 VW: CPT | Mod: 26

## 2017-12-18 PROCEDURE — 72040 X-RAY EXAM NECK SPINE 2-3 VW: CPT

## 2018-01-09 ENCOUNTER — FORM ENCOUNTER (OUTPATIENT)
Age: 63
End: 2018-01-09

## 2018-01-10 ENCOUNTER — OUTPATIENT (OUTPATIENT)
Dept: OUTPATIENT SERVICES | Facility: HOSPITAL | Age: 63
LOS: 1 days | End: 2018-01-10
Payer: COMMERCIAL

## 2018-01-10 DIAGNOSIS — Z98.1 ARTHRODESIS STATUS: Chronic | ICD-10-CM

## 2018-01-10 PROCEDURE — 72125 CT NECK SPINE W/O DYE: CPT | Mod: 26

## 2018-01-10 PROCEDURE — 72125 CT NECK SPINE W/O DYE: CPT

## 2018-01-17 ENCOUNTER — APPOINTMENT (OUTPATIENT)
Dept: SPINE | Facility: CLINIC | Age: 63
End: 2018-01-17
Payer: COMMERCIAL

## 2018-01-17 VITALS
SYSTOLIC BLOOD PRESSURE: 114 MMHG | HEIGHT: 69 IN | WEIGHT: 200 LBS | HEART RATE: 75 BPM | BODY MASS INDEX: 29.62 KG/M2 | OXYGEN SATURATION: 96 % | DIASTOLIC BLOOD PRESSURE: 76 MMHG

## 2018-01-17 DIAGNOSIS — G95.9 DISEASE OF SPINAL CORD, UNSPECIFIED: ICD-10-CM

## 2018-01-17 DIAGNOSIS — G95.20 UNSPECIFIED CORD COMPRESSION: ICD-10-CM

## 2018-01-17 PROCEDURE — 99214 OFFICE O/P EST MOD 30 MIN: CPT

## 2018-01-19 PROBLEM — G95.9 CERVICAL MYELOPATHY: Status: ACTIVE | Noted: 2017-04-26

## 2018-01-19 PROBLEM — G95.20: Status: ACTIVE | Noted: 2017-07-12

## 2019-12-27 NOTE — PATIENT PROFILE ADULT. - DOES PATIENT HAVE ADVANCE DIRECTIVE
Assessment/Plan:    Uncontrolled type 2 diabetes mellitus with hyperglycemia (HCC)    Lab Results   Component Value Date    HGBA1C 8 0 (H) 11/13/2019   D/w pt that prednisone can be used if migraine does not improve but going to defer at this time d/t his uncontrolled DM, call if not better, Giovanna does updated, con't meds and labs as previously directed    Migraine without aura and without status migrainosus, not intractable  Currently with acute migraine episode - no known trigger noted - advised to con't Ibuprofen 600 mg every 8 hrs prn and will refill Butalbital-APAP as well - rx sent, SE reviewed with pt, red flag neuro symptoms reviewed and urged to call asap if they occur, encouraged to keep hydrated as well       Diagnoses and all orders for this visit:    Migraine without aura and without status migrainosus, not intractable  -     Butalbital-APAP-Caffeine -40 MG CAPS; Take 1 capsule by mouth every 8 (eight) hours as needed (headache)    Uncontrolled type 2 diabetes mellitus with hyperglycemia (HCC)    Other orders  -     ciclopirox (PENLAC) 8 % solution; Penlac 8 % topical solution   APPLY TO THE AFFECTED AREA(S) BY TOPICAL ROUTE ONCE DAILY PREFERABLY AT BEDTIME OR 8 HOURS BEFORE WASHING          Subjective:      Patient ID: Xena lFores is a 47 y o  male  HPI Pt here with c/o migraine x 2 days  Symptoms started yesterday  Head pain is R temporal region and above the R eye and goes down to R upper jaw region  Pain does go across forehead to above L eye as well  The pain is described as "pressure" and is constant and is a 5/10  He notes no specific sensitivity to sound but light does make it worse  He notes no N/V/dizziness/double vision/loss of vision/slurred speech/word finding difficulty/focal weakness or numbness  He feels his R eye has a bit of blurry vision and he has some tingling at the R side of his face  He has tried Ibuprofen w/o great benefit - took 600 mg x 1 yesterday    He has a h/o of migraines and this is slightly different in that the R side of the face is also affected - usually symptoms just above forehead  He notes no recent illness/head injury to trigger the pain  He is diabetic and does check his sugars - earlier today sugars was 194 2 hrs after eating  He is only  Using 30 U of Lantus daily and feels that is working well for his sugars        Review of Systems   Constitutional: Negative for chills and fever  HENT: Negative for congestion and sinus pain  Eyes: Positive for photophobia  Negative for pain and visual disturbance  Respiratory: Negative for cough and shortness of breath  Cardiovascular: Negative for chest pain and palpitations  Gastrointestinal: Negative for diarrhea, nausea and vomiting  Genitourinary: Negative for difficulty urinating and dysuria  Musculoskeletal: Negative for arthralgias, gait problem and myalgias  Skin: Negative for rash and wound  Neurological: Positive for headaches  Negative for dizziness, speech difficulty, weakness and numbness  Hematological: Does not bruise/bleed easily  Psychiatric/Behavioral: Negative for behavioral problems and confusion  Objective:    /82 (BP Location: Right arm, Patient Position: Sitting, Cuff Size: Standard)   Pulse 84   Temp 98 °F (36 7 °C)   Ht 5' 8" (1 727 m)   Wt 116 kg (256 lb)   BMI 38 92 kg/m²      Physical Exam   Constitutional: He appears well-developed and well-nourished  No distress  HENT:   Head: Normocephalic and atraumatic  Right Ear: External ear normal    Left Ear: External ear normal    Mouth/Throat: Oropharynx is clear and moist  No oropharyngeal exudate  Eyes: Pupils are equal, round, and reactive to light  Conjunctivae and EOM are normal  Right eye exhibits no discharge  Left eye exhibits no discharge  Neck: Neck supple  No tracheal deviation present  Cardiovascular: Normal rate, regular rhythm and normal heart sounds     No murmur heard   Pulmonary/Chest: Effort normal and breath sounds normal  No respiratory distress  He has no wheezes  He has no rales  Musculoskeletal: He exhibits no deformity  Nml gait w/o assistance, 5/5 global muscle strength   Lymphadenopathy:     He has no cervical adenopathy  Neurological: He is alert  He displays normal reflexes  No cranial nerve deficit or sensory deficit  He exhibits normal muscle tone  Coordination normal    CN II-XII intact except some tingling noted with light touch at R V2 distribution, sensation intact globally, 2/4 patellar DTR B/L, nml FN and HS, neg Rhomberg   Skin: Skin is warm and dry  Psoriatic plaques noted   Psychiatric: He has a normal mood and affect  His behavior is normal    Nursing note and vitals reviewed  No

## 2025-01-29 NOTE — PRE-OP CHECKLIST - NEEDLE GAUGE
Attending Physician Statement  I have discussed the care of Yo Clark, including pertinent history and exam findings with the resident. I agree with the assessment, and status of the problem list as documented.   Diagnosis Orders   1. Sciatic nerve pain, right  gabapentin (NEURONTIN) 300 MG capsule    ibuprofen (ADVIL;MOTRIN) 800 MG tablet    acetaminophen (TYLENOL) 500 MG tablet    lidocaine (LIDODERM) 5 %    MRI LUMBAR SPINE WO CONTRAST    [facility]         The plan and orders should include   Orders Placed This Encounter   Procedures    MRI LUMBAR SPINE WO CONTRAST    [facility], [facility]    and this was also documented by the resident.  I agree with the referral to NS.The medication list was reviewed with the resident and is up to date. The return visit should be in 2 weeks .    Dr Zulema Zabala MD, FACP  Associate , Internal Medicine Residency Program  Residency [facility] , [facility]  Chair, Department of Internal Medicine  [facility]         1/29/2025, 9:50 AM         20

## 2025-03-06 NOTE — PROGRESS NOTE ADULT - PROBLEM/PLAN-1
Split night 5/30/24 - not completed -recommend to nursing home staff to schedule study  Has excessive daytime sleepiness, hypercapnic respiratory failure    Recommend completing split-night with transcutaneous.  Patient is on 2 L chronically.  Low threshold to proceed to BiPAP to help with oxygenation and hypercapnia  Follow-up to be determined after sleep study  Orders:    Split Study w/ Transcutaneous; Future    
DISPLAY PLAN FREE TEXT
